# Patient Record
Sex: MALE | Race: WHITE | Employment: FULL TIME | URBAN - METROPOLITAN AREA
[De-identification: names, ages, dates, MRNs, and addresses within clinical notes are randomized per-mention and may not be internally consistent; named-entity substitution may affect disease eponyms.]

---

## 2022-08-22 ENCOUNTER — HOSPITAL ENCOUNTER (INPATIENT)
Age: 57
LOS: 1 days | Discharge: HOME | DRG: 084 | End: 2022-08-24
Attending: EMERGENCY MEDICINE | Admitting: SURGERY
Payer: COMMERCIAL

## 2022-08-22 ENCOUNTER — APPOINTMENT (OUTPATIENT)
Dept: CT IMAGING | Age: 57
DRG: 084 | End: 2022-08-22
Payer: COMMERCIAL

## 2022-08-22 ENCOUNTER — APPOINTMENT (OUTPATIENT)
Dept: GENERAL RADIOLOGY | Age: 57
DRG: 084 | End: 2022-08-22
Payer: COMMERCIAL

## 2022-08-22 DIAGNOSIS — I60.9 SUBARACHNOID HEMORRHAGE (HCC): ICD-10-CM

## 2022-08-22 DIAGNOSIS — R55 SYNCOPE AND COLLAPSE: Primary | ICD-10-CM

## 2022-08-22 LAB
ALBUMIN SERPL-MCNC: 4.7 G/DL (ref 3.5–5.2)
ALP BLD-CCNC: 96 U/L (ref 40–129)
ALT SERPL-CCNC: 25 U/L (ref 0–40)
AMPHETAMINE SCREEN, URINE: NOT DETECTED
ANION GAP SERPL CALCULATED.3IONS-SCNC: 12 MMOL/L (ref 7–16)
APTT: 28.5 SEC (ref 24.5–35.1)
AST SERPL-CCNC: 18 U/L (ref 0–39)
BARBITURATE SCREEN URINE: NOT DETECTED
BASOPHILS ABSOLUTE: 0.05 E9/L (ref 0–0.2)
BASOPHILS RELATIVE PERCENT: 0.6 % (ref 0–2)
BENZODIAZEPINE SCREEN, URINE: NOT DETECTED
BILIRUB SERPL-MCNC: 0.4 MG/DL (ref 0–1.2)
BUN BLDV-MCNC: 14 MG/DL (ref 6–20)
CALCIUM SERPL-MCNC: 9.5 MG/DL (ref 8.6–10.2)
CANNABINOID SCREEN URINE: NOT DETECTED
CHLORIDE BLD-SCNC: 103 MMOL/L (ref 98–107)
CO2: 24 MMOL/L (ref 22–29)
COCAINE METABOLITE SCREEN URINE: NOT DETECTED
CREAT SERPL-MCNC: 1.1 MG/DL (ref 0.7–1.2)
EOSINOPHILS ABSOLUTE: 0.08 E9/L (ref 0.05–0.5)
EOSINOPHILS RELATIVE PERCENT: 0.9 % (ref 0–6)
FENTANYL SCREEN, URINE: NOT DETECTED
GFR AFRICAN AMERICAN: >60
GFR NON-AFRICAN AMERICAN: >60 ML/MIN/1.73
GLUCOSE BLD-MCNC: 100 MG/DL (ref 74–99)
HCT VFR BLD CALC: 45.2 % (ref 37–54)
HEMOGLOBIN: 14.9 G/DL (ref 12.5–16.5)
IMMATURE GRANULOCYTES #: 0.03 E9/L
IMMATURE GRANULOCYTES %: 0.3 % (ref 0–5)
INR BLD: 1.1
LYMPHOCYTES ABSOLUTE: 1.69 E9/L (ref 1.5–4)
LYMPHOCYTES RELATIVE PERCENT: 19 % (ref 20–42)
Lab: NORMAL
MCH RBC QN AUTO: 28.6 PG (ref 26–35)
MCHC RBC AUTO-ENTMCNC: 33 % (ref 32–34.5)
MCV RBC AUTO: 86.8 FL (ref 80–99.9)
METHADONE SCREEN, URINE: NOT DETECTED
MONOCYTES ABSOLUTE: 0.5 E9/L (ref 0.1–0.95)
MONOCYTES RELATIVE PERCENT: 5.6 % (ref 2–12)
NEUTROPHILS ABSOLUTE: 6.56 E9/L (ref 1.8–7.3)
NEUTROPHILS RELATIVE PERCENT: 73.6 % (ref 43–80)
OPIATE SCREEN URINE: NOT DETECTED
OXYCODONE URINE: NOT DETECTED
PDW BLD-RTO: 12.6 FL (ref 11.5–15)
PHENCYCLIDINE SCREEN URINE: NOT DETECTED
PLATELET # BLD: 225 E9/L (ref 130–450)
PMV BLD AUTO: 10.4 FL (ref 7–12)
POTASSIUM REFLEX MAGNESIUM: 3.8 MMOL/L (ref 3.5–5)
PROTHROMBIN TIME: 12.1 SEC (ref 9.3–12.4)
RBC # BLD: 5.21 E12/L (ref 3.8–5.8)
SODIUM BLD-SCNC: 139 MMOL/L (ref 132–146)
TOTAL PROTEIN: 7.2 G/DL (ref 6.4–8.3)
TROPONIN, HIGH SENSITIVITY: 6 NG/L (ref 0–11)
WBC # BLD: 8.9 E9/L (ref 4.5–11.5)

## 2022-08-22 PROCEDURE — 70450 CT HEAD/BRAIN W/O DYE: CPT

## 2022-08-22 PROCEDURE — 80053 COMPREHEN METABOLIC PANEL: CPT

## 2022-08-22 PROCEDURE — 93005 ELECTROCARDIOGRAM TRACING: CPT | Performed by: PHYSICIAN ASSISTANT

## 2022-08-22 PROCEDURE — 6360000002 HC RX W HCPCS

## 2022-08-22 PROCEDURE — 96376 TX/PRO/DX INJ SAME DRUG ADON: CPT

## 2022-08-22 PROCEDURE — 85610 PROTHROMBIN TIME: CPT

## 2022-08-22 PROCEDURE — 85730 THROMBOPLASTIN TIME PARTIAL: CPT

## 2022-08-22 PROCEDURE — 71046 X-RAY EXAM CHEST 2 VIEWS: CPT

## 2022-08-22 PROCEDURE — 80307 DRUG TEST PRSMV CHEM ANLYZR: CPT

## 2022-08-22 PROCEDURE — 71275 CT ANGIOGRAPHY CHEST: CPT

## 2022-08-22 PROCEDURE — 85025 COMPLETE CBC W/AUTO DIFF WBC: CPT

## 2022-08-22 PROCEDURE — 70498 CT ANGIOGRAPHY NECK: CPT

## 2022-08-22 PROCEDURE — 96374 THER/PROPH/DIAG INJ IV PUSH: CPT

## 2022-08-22 PROCEDURE — 72125 CT NECK SPINE W/O DYE: CPT

## 2022-08-22 PROCEDURE — 6360000004 HC RX CONTRAST MEDICATION: Performed by: RADIOLOGY

## 2022-08-22 PROCEDURE — 70496 CT ANGIOGRAPHY HEAD: CPT

## 2022-08-22 PROCEDURE — 99285 EMERGENCY DEPT VISIT HI MDM: CPT

## 2022-08-22 PROCEDURE — 96372 THER/PROPH/DIAG INJ SC/IM: CPT

## 2022-08-22 PROCEDURE — 84484 ASSAY OF TROPONIN QUANT: CPT

## 2022-08-22 PROCEDURE — 2500000003 HC RX 250 WO HCPCS

## 2022-08-22 PROCEDURE — 96375 TX/PRO/DX INJ NEW DRUG ADDON: CPT

## 2022-08-22 RX ORDER — LABETALOL HYDROCHLORIDE 5 MG/ML
10 INJECTION, SOLUTION INTRAVENOUS ONCE
Status: COMPLETED | OUTPATIENT
Start: 2022-08-22 | End: 2022-08-22

## 2022-08-22 RX ORDER — HYDRALAZINE HYDROCHLORIDE 20 MG/ML
10 INJECTION INTRAMUSCULAR; INTRAVENOUS ONCE
Status: DISCONTINUED | OUTPATIENT
Start: 2022-08-22 | End: 2022-08-22

## 2022-08-22 RX ORDER — HYDRALAZINE HYDROCHLORIDE 20 MG/ML
10 INJECTION INTRAMUSCULAR; INTRAVENOUS
Status: DISCONTINUED | OUTPATIENT
Start: 2022-08-22 | End: 2022-08-23 | Stop reason: ALTCHOICE

## 2022-08-22 RX ADMIN — IOPAMIDOL 120 ML: 755 INJECTION, SOLUTION INTRAVENOUS at 22:49

## 2022-08-22 RX ADMIN — TRIMETHOBENZAMIDE HYDROCHLORIDE 200 MG: 100 INJECTION INTRAMUSCULAR at 22:53

## 2022-08-22 RX ADMIN — LABETALOL HYDROCHLORIDE 10 MG: 5 INJECTION, SOLUTION INTRAVENOUS at 21:59

## 2022-08-22 RX ADMIN — LABETALOL HYDROCHLORIDE 10 MG: 5 INJECTION INTRAVENOUS at 21:16

## 2022-08-22 RX ADMIN — HYDRALAZINE HYDROCHLORIDE 10 MG: 20 INJECTION INTRAMUSCULAR; INTRAVENOUS at 22:53

## 2022-08-22 RX ADMIN — HYDRALAZINE HYDROCHLORIDE 10 MG: 20 INJECTION INTRAMUSCULAR; INTRAVENOUS at 23:20

## 2022-08-22 ASSESSMENT — PAIN DESCRIPTION - LOCATION: LOCATION: HEAD;BACK

## 2022-08-22 ASSESSMENT — PAIN SCALES - GENERAL
PAINLEVEL_OUTOF10: 4
PAINLEVEL_OUTOF10: 6

## 2022-08-22 ASSESSMENT — PAIN - FUNCTIONAL ASSESSMENT: PAIN_FUNCTIONAL_ASSESSMENT: 0-10

## 2022-08-22 NOTE — ED NOTES
Department of Emergency Medicine    FIRST PROVIDER TRIAGE NOTE             Independent MLP           8/22/22  4:48 PM EDT    Date of Encounter: 8/22/22   MRN: [id number]    Vitals:    08/22/22 1634 08/22/22 1647   BP:  (!) 188/112   Pulse: 99    Resp:  18   Temp: 97.4 °F (36.3 °C)    TempSrc: Oral    SpO2: 98%       HPI: Vergia Olivia is a 60 y.o. male who presents to the ED for Loss of Consciousness (Pt was at work and had a sudden loss of consciousness. Pt reports today first day back to work after having covid. )   Reports headache     ROS: Negative for cp or sob. Physical Exam:   Gen Appearance/Constitutional: alert  CV: regular rate     Initial Plan of Care: All treatment areas with department are currently occupied. Plan to order/Initiate the following while awaiting opening in ED: labs, EKG, and imaging studies.     Initial Plan of Care: Initiate Treatment-Testing, Proceed toTreatment Area When Bed Available for ED Attending/MLP to Continue Care    Electronically signed by Mana Chavez PA-C   DD: 8/22/22       Mana Chavez PA-C  08/22/22 0446

## 2022-08-23 ENCOUNTER — APPOINTMENT (OUTPATIENT)
Dept: GENERAL RADIOLOGY | Age: 57
DRG: 084 | End: 2022-08-23
Payer: COMMERCIAL

## 2022-08-23 PROBLEM — R55 SYNCOPE: Status: ACTIVE | Noted: 2022-08-23

## 2022-08-23 PROBLEM — T14.90XA TRAUMA: Status: ACTIVE | Noted: 2022-08-23

## 2022-08-23 PROBLEM — I60.9 SAH (SUBARACHNOID HEMORRHAGE) (HCC): Status: ACTIVE | Noted: 2022-08-23

## 2022-08-23 LAB
ACETAMINOPHEN LEVEL: <5 MCG/ML (ref 10–30)
ANGLE (CLOT STRENGTH): 76.6 DEGREE (ref 59–74)
EKG ATRIAL RATE: 94 BPM
EKG P AXIS: 33 DEGREES
EKG P-R INTERVAL: 190 MS
EKG Q-T INTERVAL: 404 MS
EKG QRS DURATION: 140 MS
EKG QTC CALCULATION (BAZETT): 505 MS
EKG R AXIS: 35 DEGREES
EKG T AXIS: -83 DEGREES
EKG VENTRICULAR RATE: 94 BPM
EPL-TEG: 1.7 % (ref 0–15)
ETHANOL: <10 MG/DL (ref 0–0.08)
G-TEG: 12.3 K D/SC (ref 4.5–11)
K (CLOTTING TIME): 0.9 MIN (ref 1–3)
LY30 (FIBRINOLYSIS): 1.7 % (ref 0–8)
MA (MAX AMPLITUDE): 71.1 MM (ref 50–70)
R (REACTION TIME): 2.9 MIN (ref 5–10)
REASON FOR REJECTION: NORMAL
REJECTED TEST: NORMAL
SALICYLATE, SERUM: <0.3 MG/DL (ref 0–30)
TRICYCLIC ANTIDEPRESSANTS SCREEN SERUM: NEGATIVE NG/ML
TROPONIN, HIGH SENSITIVITY: 7 NG/L (ref 0–11)
TSH SERPL DL<=0.05 MIU/L-ACNC: 1.36 UIU/ML (ref 0.27–4.2)

## 2022-08-23 PROCEDURE — 2060000000 HC ICU INTERMEDIATE R&B

## 2022-08-23 PROCEDURE — 82077 ASSAY SPEC XCP UR&BREATH IA: CPT

## 2022-08-23 PROCEDURE — 2580000003 HC RX 258: Performed by: STUDENT IN AN ORGANIZED HEALTH CARE EDUCATION/TRAINING PROGRAM

## 2022-08-23 PROCEDURE — 96376 TX/PRO/DX INJ SAME DRUG ADON: CPT

## 2022-08-23 PROCEDURE — 97161 PT EVAL LOW COMPLEX 20 MIN: CPT

## 2022-08-23 PROCEDURE — 85576 BLOOD PLATELET AGGREGATION: CPT

## 2022-08-23 PROCEDURE — 72170 X-RAY EXAM OF PELVIS: CPT

## 2022-08-23 PROCEDURE — 84484 ASSAY OF TROPONIN QUANT: CPT

## 2022-08-23 PROCEDURE — 6370000000 HC RX 637 (ALT 250 FOR IP): Performed by: STUDENT IN AN ORGANIZED HEALTH CARE EDUCATION/TRAINING PROGRAM

## 2022-08-23 PROCEDURE — 99223 1ST HOSP IP/OBS HIGH 75: CPT | Performed by: SURGERY

## 2022-08-23 PROCEDURE — 80143 DRUG ASSAY ACETAMINOPHEN: CPT

## 2022-08-23 PROCEDURE — 84443 ASSAY THYROID STIM HORMONE: CPT

## 2022-08-23 PROCEDURE — 6360000002 HC RX W HCPCS

## 2022-08-23 PROCEDURE — 80307 DRUG TEST PRSMV CHEM ANLYZR: CPT

## 2022-08-23 PROCEDURE — 85384 FIBRINOGEN ACTIVITY: CPT

## 2022-08-23 PROCEDURE — 6370000000 HC RX 637 (ALT 250 FOR IP)

## 2022-08-23 PROCEDURE — 36415 COLL VENOUS BLD VENIPUNCTURE: CPT

## 2022-08-23 PROCEDURE — 85347 COAGULATION TIME ACTIVATED: CPT

## 2022-08-23 PROCEDURE — 80179 DRUG ASSAY SALICYLATE: CPT

## 2022-08-23 RX ORDER — SODIUM CHLORIDE 0.9 % (FLUSH) 0.9 %
10 SYRINGE (ML) INJECTION EVERY 12 HOURS SCHEDULED
Status: DISCONTINUED | OUTPATIENT
Start: 2022-08-23 | End: 2022-08-24 | Stop reason: HOSPADM

## 2022-08-23 RX ORDER — ONDANSETRON 4 MG/1
4 TABLET, ORALLY DISINTEGRATING ORAL EVERY 8 HOURS PRN
Status: DISCONTINUED | OUTPATIENT
Start: 2022-08-23 | End: 2022-08-24 | Stop reason: HOSPADM

## 2022-08-23 RX ORDER — LEVETIRACETAM 500 MG/1
500 TABLET ORAL 2 TIMES DAILY
Status: DISCONTINUED | OUTPATIENT
Start: 2022-08-23 | End: 2022-08-24 | Stop reason: HOSPADM

## 2022-08-23 RX ORDER — SODIUM CHLORIDE 9 MG/ML
INJECTION, SOLUTION INTRAVENOUS PRN
Status: DISCONTINUED | OUTPATIENT
Start: 2022-08-23 | End: 2022-08-24 | Stop reason: HOSPADM

## 2022-08-23 RX ORDER — LABETALOL HYDROCHLORIDE 5 MG/ML
10 INJECTION, SOLUTION INTRAVENOUS
Status: DISCONTINUED | OUTPATIENT
Start: 2022-08-23 | End: 2022-08-24 | Stop reason: HOSPADM

## 2022-08-23 RX ORDER — ACETAMINOPHEN 500 MG
1000 TABLET ORAL ONCE
Status: COMPLETED | OUTPATIENT
Start: 2022-08-23 | End: 2022-08-23

## 2022-08-23 RX ORDER — ONDANSETRON 2 MG/ML
4 INJECTION INTRAMUSCULAR; INTRAVENOUS EVERY 6 HOURS PRN
Status: DISCONTINUED | OUTPATIENT
Start: 2022-08-23 | End: 2022-08-24 | Stop reason: HOSPADM

## 2022-08-23 RX ORDER — POLYETHYLENE GLYCOL 3350 17 G/17G
17 POWDER, FOR SOLUTION ORAL DAILY
Status: DISCONTINUED | OUTPATIENT
Start: 2022-08-23 | End: 2022-08-24 | Stop reason: HOSPADM

## 2022-08-23 RX ORDER — HYDRALAZINE HYDROCHLORIDE 20 MG/ML
10 INJECTION INTRAMUSCULAR; INTRAVENOUS
Status: DISCONTINUED | OUTPATIENT
Start: 2022-08-23 | End: 2022-08-24 | Stop reason: HOSPADM

## 2022-08-23 RX ORDER — ACETAMINOPHEN 325 MG/1
650 TABLET ORAL EVERY 4 HOURS PRN
Status: DISCONTINUED | OUTPATIENT
Start: 2022-08-23 | End: 2022-08-24 | Stop reason: HOSPADM

## 2022-08-23 RX ORDER — SODIUM CHLORIDE 0.9 % (FLUSH) 0.9 %
10 SYRINGE (ML) INJECTION PRN
Status: DISCONTINUED | OUTPATIENT
Start: 2022-08-23 | End: 2022-08-24 | Stop reason: HOSPADM

## 2022-08-23 RX ADMIN — ACETAMINOPHEN 1000 MG: 500 TABLET ORAL at 00:43

## 2022-08-23 RX ADMIN — LEVETIRACETAM 500 MG: 500 TABLET, FILM COATED ORAL at 09:14

## 2022-08-23 RX ADMIN — Medication 10 ML: at 11:20

## 2022-08-23 RX ADMIN — HYDRALAZINE HYDROCHLORIDE 10 MG: 20 INJECTION INTRAMUSCULAR; INTRAVENOUS at 00:07

## 2022-08-23 RX ADMIN — LEVETIRACETAM 500 MG: 500 TABLET, FILM COATED ORAL at 20:29

## 2022-08-23 RX ADMIN — Medication 10 ML: at 20:30

## 2022-08-23 RX ADMIN — LEVETIRACETAM 500 MG: 500 TABLET, FILM COATED ORAL at 01:27

## 2022-08-23 ASSESSMENT — PAIN SCALES - GENERAL: PAINLEVEL_OUTOF10: 0

## 2022-08-23 ASSESSMENT — ENCOUNTER SYMPTOMS
ABDOMINAL DISTENTION: 0
SHORTNESS OF BREATH: 1
DIARRHEA: 0
VOMITING: 0
CONSTIPATION: 0
WHEEZING: 0
EYE REDNESS: 0
RHINORRHEA: 0
CHEST TIGHTNESS: 0
BACK PAIN: 0
ABDOMINAL PAIN: 0
TROUBLE SWALLOWING: 0
EYE ITCHING: 0
NAUSEA: 0

## 2022-08-23 NOTE — ED NOTES
Pt requesting to go home. Admitting team perfect served.  Pt given lunch juan Mello RN  08/23/22 [id number]

## 2022-08-23 NOTE — PROGRESS NOTES
Physical Therapy    Initial Assessment     Name: Ricardo Weiss  : 1965  MRN: [id number]      Date of Service: 2022    Evaluating PT: Gin Padilla PT, DPT [id number]      Room #:  8753/7383-P  Diagnosis:  Trauma [T14.90XA]  PMHx/PSHx:  HTN, LBBB, JOANNA on CPAP  Precautions:  SAH    SUBJECTIVE:    Pt lives with wife in a 2 story house with level entry. 7 stairs and 2 rails up to second floor bed and bath. Pt ambulated without AD prior to admission. 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 OBJECTIVE:   Initial Evaluation  Date:    AM-PAC 6 Clicks /   Was pt agreeable to Eval/treatment? Yes   Does pt have pain? /10 headache pain   Bed Mobility  Rolling: NT  Supine to sit: NT  Sit to supine: NT  Scooting: NT   Transfers Sit to stand: Independent   Stand to sit: Independent   Stand pivot: Independent    Ambulation   200 feet Independent    Stair negotiation: ascended and descended NT   ROM BUE: Refer to OT note  BLE: WFL   Strength BUE: Refer to OT note  BLE: WFL   Balance Sitting EOB: Independent   Dynamic Standing: Independent      Pt is A & O x: 4 to person, place, month/year, and situation. Sensation: Pt denies numbness and tingling of extremities. Edema: Unremarkable. Patient education  Pt educated on PT role in acute care setting. Patient response to education:   Pt verbalized understanding Pt demonstrated skill Pt requires further education in this area   Yes NA No     ASSESSMENT:    Comments:    Pt was seated at EOB upon room entry, agreeable to PT evaluation. Visitors present throughout session. Pt ambulated [facility] around unit without AD. Gait was slow but fairly steady. Pt reports they are at functional baseline and that there are no skilled PT needs at this time. Pt was left seated at EOB.     PHYSICAL THERAPY PLAN OF CARE:    PT POC is established based on physician order and patient diagnosis     Based on pt's current level of independence for all functional mobility, this pt is not a candidate for continued skilled PT services. Pt is agreeable that there are no skilled PT needs at this time. Will complete order and remove pt from PT caseload. Please re-consult if pt experiences functional decline. Thank you. Referring provider/PT Order:    Start   Ordering Provider    08/23/22 0915  PT evaluation and treat  Start:  08/23/22 0915,   End:  08/23/22 0915,   ONE TIME,   Standing Count:  1 Occurrences,   Teresa Chow MD      Diagnosis:  Trauma [T14.90XA]    Time in  1500  Time out  1510    Total Treatment Time  0 minutes     Evaluation Time includes thorough review of current medical information, gathering information on past medical history/social history and prior level of function, completion of standardized testing/informal observation of tasks, assessment of data and education on plan of care and goals.     CPT codes:  [x] Low Complexity PT evaluation 85483  [] Moderate Complexity PT evaluation 80704  [] High Complexity PT evaluation 60279  [] PT Re-evaluation 98363  [] Gait training 26342 0 minutes  [] Manual therapy 48303 0 minutes  [] Therapeutic activities 11262 0 minutes  [] Therapeutic exercises 35841 0 minutes  [] Neuromuscular reeducation 37349 0 minutes     Stefania Wild, PT, DPT  [id number]

## 2022-08-23 NOTE — CONSULTS
Neurosurgery Consult Note      CHIEF COMPLAINT: syncopal episode sustaining right frontal traumatic SAH     HPI:  Trauma consult. Injury occurred 3 PM yesterday afternoon. Patient was reportedly at work. He had a syncopal fall. He is amnestic to the event. He thinks that he hit his head. He reports loss of consciousness. He has had multiple prior similar episodes in the past.  He states that he thinks that he saw a doctor for these in 2011 and he was told that he was dehydrated. He is GCS 15 and neurologically intact. He is not on anticoagulation. Patient reportedly has a known left bundle branch block and occasional PVCs. He does not see anyone for these in a long time. He underwent CT head which revealed frontal SAH. Past Medical History:   Diagnosis Date    Hypertension     LBBB (left bundle branch block)     Obesity     JOANNA on CPAP 2001    SOB (shortness of breath) 2001     Past Surgical History:   Procedure Laterality Date    [address]    abdominal hernia     Paxil [paroxetine] and Pcn [penicillins]  Prior to Admission medications    Not on File     No outpatient medications have been marked as taking for the 8/22/22 encounter [facility] Encounter).      Social History     Socioeconomic History    Marital status:      Spouse name: Not on file    Number of children: Not on file    Years of education: Not on file    Highest education level: Not on file   Occupational History    Not on file   Tobacco Use    Smoking status: Never    Smokeless tobacco: Current   Substance and Sexual Activity    Alcohol use: No    Drug use: Not on file    Sexual activity: Not on file   Other Topics Concern    Not on file   Social History Narrative    Not on file     Social Determinants of Health     Financial Resource Strain: Not on file   Food Insecurity: Not on file   Transportation Needs: Not on file   Physical Activity: Not on file   Stress: Not on file   Social Connections: Not on file   Intimate Partner Violence: Not on file   Housing Stability: Not on file     Family History   Adopted: Yes       ROS: Complete 10 point ROS was obtained with positives in HPI, otherwise:  Pt denies fevers, denies chills. Pt denies chest pain, denies SOB, denies nausea, denies vomiting, denies headache. VITALS/DRAINS:   VITALS:  BP (!) 148/83   Pulse 93   Temp 97.4 °F (36.3 °C) (Oral)   Resp 16   Ht 5' 10\" (1.778 m)   Wt 219 lb (99.3 kg)   SpO2 97%   BMI 31.42 kg/m²   24HR INTAKE/OUTPUT:  No intake or output data in the 24 hours ending 08/23/22 1657    EXAMINATION:Laying supine in hospital bed awake alert oriented friendly and cooperative no acute distress speech is fluent answer questions appropriately oriented x3  Cranial Nerves:Pupils equal round reactive light extraocular is full visual fields are full facial expressions symmetric shoulder shrug normal tongue midline  Motor:No weakness 5 out of 5 throughout  Sensory:No deficits intact to light touch throughout      IMAGING STUDIES:  XR CHEST (2 VW)    Result Date: 8/22/2022  EXAMINATION: TWO XRAY VIEWS OF THE CHEST 8/22/2022 5:29 pm COMPARISON: Chest, two view 09/05/2017 HISTORY: ORDERING SYSTEM PROVIDED HISTORY: syncope TECHNOLOGIST PROVIDED HISTORY: Reason for exam:->syncope What reading provider will be dictating this exam?->CRC FINDINGS: PA and lateral views of the chest were obtained. Heart, mediastinum, and pulmonary vasculature are within normal limits. There is biapical pleuroparenchymal scarring. Lungs and pleural spaces are otherwise clear. No active cardiopulmonary disease. XR PELVIS (1-2 VIEWS)    Result Date: 8/23/2022  EXAMINATION: ONE XRAY VIEW OF THE PELVIS 8/23/2022 1:05 am COMPARISON: None. HISTORY: ORDERING SYSTEM PROVIDED HISTORY: trauma TECHNOLOGIST PROVIDED HISTORY: Reason for exam:->trauma What reading provider will be dictating this exam?->CRC FINDINGS: There is no evidence of acute fracture. There is normal alignment. No acute joint abnormality. No focal osseous lesion. No focal soft tissue abnormality. No acute osseous abnormality. CT HEAD WO CONTRAST    Result Date: 8/22/2022  EXAMINATION: CT OF THE HEAD WITHOUT CONTRAST; CT OF THE CERVICAL SPINE WITHOUT CONTRAST 8/22/2022 5:43 pm TECHNIQUE: CT of the head was performed without the administration of intravenous contrast. Automated exposure control, iterative reconstruction, and/or weight based adjustment of the mA/kV was utilized to reduce the radiation dose to as low as reasonably achievable.; CT of the cervical spine was performed without the administration of intravenous contrast. Multiplanar reformatted images are provided for review. Automated exposure control, iterative reconstruction, and/or weight based adjustment of the mA/kV was utilized to reduce the radiation dose to as low as reasonably achievable. COMPARISON: None. HISTORY: ORDERING SYSTEM PROVIDED HISTORY: LOC- Activity Rocket head TECHNOLOGIST PROVIDED HISTORY: Reason for exam:->LOC- hit head Has a \"code stroke\" or \"stroke alert\" been called? ->No Decision Support Exception - unselect if not a suspected or confirmed emergency medical condition->Emergency Medical Condition ([place]) What reading provider will be dictating this exam?->CRC FINDINGS: CT OF THE BRAIN: BRAIN/VENTRICLES: A small focus of predominantly if not entirely subarachnoid hemorrhage is seen in the anterior right frontal lobe (series 2, image 34). There is likely involvement of the adjacent cortex as well. No significant edema or mass effect is seen. The brain is normal in volume. No hydrocephalus seen. ORBITS: The visualized portion of the orbits demonstrate no acute abnormality. SINUSES: Mild-to-moderate mucosal thickening is seen in the right sphenoid sinus. Mild mucosal thickening in the ethmoid sinuses. SOFT TISSUES/SKULL: The calvarium is intact without fracture or focal lesion.  The left posterior scalp hematoma is noted. CT CERVICAL SPINE: BONES/ALIGNMENT: There is no acute fracture or traumatic malalignment. DEGENERATIVE CHANGES: Moderate loss of disc height with a disc osteophyte complex is noted at C6-7. At C6-7, there is mild central canal stenosis and moderate neural foraminal stenoses. SOFT TISSUES: There is no prevertebral soft tissue swelling. CT HEAD WITHOUT CONTRAST: 1. SMALL VOLUME SUBARACHNOID BLOOD is seen along the right anterior convexity, with probable involvement of the adjacent cortex. Findings appear to represent a contrecoup injury, given the presence of a hematoma in the left posterior scalp. 2. No mass effect or midline shift. 3. No skull fracture. CT CERVICAL SPINE WITHOUT CONTRAST: 1. No fracture or joint dislocation is seen. 2. Degenerative changes, as described. CT CERVICAL SPINE WO CONTRAST    Result Date: 8/22/2022  EXAMINATION: CT OF THE HEAD WITHOUT CONTRAST; CT OF THE CERVICAL SPINE WITHOUT CONTRAST 8/22/2022 5:43 pm TECHNIQUE: CT of the head was performed without the administration of intravenous contrast. Automated exposure control, iterative reconstruction, and/or weight based adjustment of the mA/kV was utilized to reduce the radiation dose to as low as reasonably achievable.; CT of the cervical spine was performed without the administration of intravenous contrast. Multiplanar reformatted images are provided for review. Automated exposure control, iterative reconstruction, and/or weight based adjustment of the mA/kV was utilized to reduce the radiation dose to as low as reasonably achievable. COMPARISON: None. HISTORY: ORDERING SYSTEM PROVIDED HISTORY: LOC- hit head TECHNOLOGIST PROVIDED HISTORY: Reason for exam:->LOC- hit head Has a \"code stroke\" or \"stroke alert\" been called? ->No Decision Support Exception - unselect if not a suspected or confirmed emergency medical condition->Emergency Medical Condition ([place]) What reading provider will be dictating this exam?->CRC FINDINGS: CT OF THE BRAIN: BRAIN/VENTRICLES: A small focus of predominantly if not entirely subarachnoid hemorrhage is seen in the anterior right frontal lobe (series 2, image 34). There is likely involvement of the adjacent cortex as well. No significant edema or mass effect is seen. The brain is normal in volume. No hydrocephalus seen. ORBITS: The visualized portion of the orbits demonstrate no acute abnormality. SINUSES: Mild-to-moderate mucosal thickening is seen in the right sphenoid sinus. Mild mucosal thickening in the ethmoid sinuses. SOFT TISSUES/SKULL: The calvarium is intact without fracture or focal lesion. The left posterior scalp hematoma is noted. CT CERVICAL SPINE: BONES/ALIGNMENT: There is no acute fracture or traumatic malalignment. DEGENERATIVE CHANGES: Moderate loss of disc height with a disc osteophyte complex is noted at C6-7. At C6-7, there is mild central canal stenosis and moderate neural foraminal stenoses. SOFT TISSUES: There is no prevertebral soft tissue swelling. CT HEAD WITHOUT CONTRAST: 1. SMALL VOLUME SUBARACHNOID BLOOD is seen along the right anterior convexity, with probable involvement of the adjacent cortex. Findings appear to represent a contrecoup injury, given the presence of a hematoma in the left posterior scalp. 2. No mass effect or midline shift. 3. No skull fracture. CT CERVICAL SPINE WITHOUT CONTRAST: 1. No fracture or joint dislocation is seen. 2. Degenerative changes, as described. CTA NECK W CONTRAST    Result Date: 8/22/2022  EXAMINATION: CTA OF THE HEAD WITH CONTRAST; CTA OF THE NECK 8/22/2022 10:49 pm: TECHNIQUE: CTA of the head/brain was performed with the administration of intravenous contrast. Multiplanar reformatted images are provided for review. MIP images are provided for review. Automated exposure control, iterative reconstruction, and/or weight based adjustment of the mA/kV was utilized to reduce the radiation dose to as low as reasonably achievable. ; CTA of the neck was performed with the administration of intravenous contrast. Multiplanar reformatted images are provided for review. MIP images are provided for review. Stenosis of the internal carotid arteries measured using NASCET criteria. Automated exposure control, iterative reconstruction, and/or weight based adjustment of the mA/kV was utilized to reduce the radiation dose to as low as reasonably achievable. Noncontrast CT of the head with reconstructed 2-D images are also provided for review. COMPARISON: Head CT from earlier the same day. HISTORY: ORDERING SYSTEM PROVIDED HISTORY: fall, subarachnoid hemorrhage on non-con head TECHNOLOGIST PROVIDED HISTORY: Reason for exam:->fall, subarachnoid hemorrhage on non-con head Has a \"code stroke\" or \"stroke alert\" been called? ->No Decision Support Exception - unselect if not a suspected or confirmed emergency medical condition->Emergency Medical Condition ([place]) What reading provider will be dictating this exam?->CRC; ORDERING SYSTEM PROVIDED HISTORY: fall, subarachnoid hemorrhage TECHNOLOGIST PROVIDED HISTORY: Reason for exam:->fall, subarachnoid hemorrhage Has a \"code stroke\" or \"stroke alert\" been called? ->No Decision Support Exception - unselect if not a suspected or confirmed emergency medical condition->Emergency Medical Condition ([place]) What reading provider will be dictating this exam?->CRC FINDINGS: CT HEAD: BRAIN/VENTRICLES:  Stable mild subarachnoid hemorrhage along the right frontal sulcations. Differentiation is maintained. No evidence of mass, mass effect or midline shift. No evidence of hydrocephalus. ORBITS: The visualized portion of the orbits demonstrate no acute abnormality. SINUSES:  Mild-to-moderate circumferential mucosal thickening involving the right maxillary sinus. The remainder of the visualized paranasal sinuses and mastoid air cells demonstrate no acute abnormality.  SOFT TISSUES/SKULL: No acute abnormality of the visualized skull or soft tissues. CTA NECK: AORTIC ARCH/ARCH VESSELS: No dissection or arterial injury. No significant stenosis of the brachiocephalic or subclavian arteries. CAROTID ARTERIES: No dissection, arterial injury, or hemodynamically significant stenosis by NASCET criteria. VERTEBRAL ARTERIES: No dissection, arterial injury, or significant stenosis. SOFT TISSUES: The lung apices are clear. No cervical or superior mediastinal lymphadenopathy. The larynx and pharynx are unremarkable. No acute abnormality of the salivary and thyroid glands. BONES: No acute osseous abnormality. CTA HEAD: ANTERIOR CIRCULATION: No significant stenosis of the intracranial internal carotid, anterior cerebral, or middle cerebral arteries. No aneurysm. POSTERIOR CIRCULATION: No significant stenosis of the vertebral, basilar, or posterior cerebral arteries. No aneurysm. OTHER: No dural venous sinus thrombosis on this non-dedicated study. 1. Stable mild subarachnoid hemorrhage along the right frontal sulcations. 2. No evidence of large vessel occlusion or hemodynamically significant stenosis involving the head and neck arteries. No evidence of aneurysm or vascular malformation. CTA PULMONARY W CONTRAST    Result Date: 8/22/2022  EXAMINATION: CTA OF THE CHEST 8/22/2022 10:49 pm TECHNIQUE: CTA of the chest was performed after the administration of intravenous contrast.  Multiplanar reformatted images are provided for review. MIP images are provided for review. Automated exposure control, iterative reconstruction, and/or weight based adjustment of the mA/kV was utilized to reduce the radiation dose to as low as reasonably achievable. COMPARISON: None.  HISTORY: ORDERING SYSTEM PROVIDED HISTORY: pe TECHNOLOGIST PROVIDED HISTORY: Reason for exam:->pe Decision Support Exception - unselect if not a suspected or confirmed emergency medical condition->Emergency Medical Condition ([place]) What reading provider will be dictating this exam?->CRC FINDINGS: Pulmonary Arteries: Pulmonary arteries are adequately opacified for evaluation. No evidence of intraluminal filling defect to suggest pulmonary embolism. Main pulmonary artery is normal in caliber. Mediastinum: No evidence of mediastinal lymphadenopathy. The heart and pericardium demonstrate no acute abnormality. There is no acute abnormality of the thoracic aorta. Lungs/pleura: The lungs are without acute process. No focal consolidation or pulmonary edema. No evidence of pleural effusion or pneumothorax. Upper Abdomen: Limited images of the upper abdomen are unremarkable. Soft Tissues/Bones: No acute bone or soft tissue abnormality. No evidence of pulmonary embolism or acute pulmonary abnormality. RECOMMENDATIONS: Unavailable     CTA HEAD W CONTRAST    Result Date: 8/22/2022  EXAMINATION: CTA OF THE HEAD WITH CONTRAST; CTA OF THE NECK 8/22/2022 10:49 pm: TECHNIQUE: CTA of the head/brain was performed with the administration of intravenous contrast. Multiplanar reformatted images are provided for review. MIP images are provided for review. Automated exposure control, iterative reconstruction, and/or weight based adjustment of the mA/kV was utilized to reduce the radiation dose to as low as reasonably achievable.; CTA of the neck was performed with the administration of intravenous contrast. Multiplanar reformatted images are provided for review. MIP images are provided for review. Stenosis of the internal carotid arteries measured using NASCET criteria. Automated exposure control, iterative reconstruction, and/or weight based adjustment of the mA/kV was utilized to reduce the radiation dose to as low as reasonably achievable. Noncontrast CT of the head with reconstructed 2-D images are also provided for review. COMPARISON: Head CT from earlier the same day.  HISTORY: ORDERING SYSTEM PROVIDED HISTORY: fall, subarachnoid hemorrhage on non-con head TECHNOLOGIST PROVIDED HISTORY: Reason for exam:->fall, subarachnoid hemorrhage on non-con head Has a \"code stroke\" or \"stroke alert\" been called? ->No Decision Support Exception - unselect if not a suspected or confirmed emergency medical condition->Emergency Medical Condition ([place]) What reading provider will be dictating this exam?->CRC; ORDERING SYSTEM PROVIDED HISTORY: fall, subarachnoid hemorrhage TECHNOLOGIST PROVIDED HISTORY: Reason for exam:->fall, subarachnoid hemorrhage Has a \"code stroke\" or \"stroke alert\" been called? ->No Decision Support Exception - unselect if not a suspected or confirmed emergency medical condition->Emergency Medical Condition (MA) What reading provider will be dictating this exam?->CRC FINDINGS: CT HEAD: BRAIN/VENTRICLES:  Stable mild subarachnoid hemorrhage along the right frontal sulcations. Differentiation is maintained. No evidence of mass, mass effect or midline shift. No evidence of hydrocephalus. ORBITS: The visualized portion of the orbits demonstrate no acute abnormality. SINUSES:  Mild-to-moderate circumferential mucosal thickening involving the right maxillary sinus. The remainder of the visualized paranasal sinuses and mastoid air cells demonstrate no acute abnormality. SOFT TISSUES/SKULL: No acute abnormality of the visualized skull or soft tissues. CTA NECK: AORTIC ARCH/ARCH VESSELS: No dissection or arterial injury. No significant stenosis of the brachiocephalic or subclavian arteries. CAROTID ARTERIES: No dissection, arterial injury, or hemodynamically significant stenosis by NASCET criteria. VERTEBRAL ARTERIES: No dissection, arterial injury, or significant stenosis. SOFT TISSUES: The lung apices are clear. No cervical or superior mediastinal lymphadenopathy. The larynx and pharynx are unremarkable. No acute abnormality of the salivary and thyroid glands. BONES: No acute osseous abnormality.  CTA HEAD: ANTERIOR CIRCULATION: No significant stenosis of the intracranial internal carotid, anterior cerebral, or middle cerebral arteries. No aneurysm. POSTERIOR CIRCULATION: No significant stenosis of the vertebral, basilar, or posterior cerebral arteries. No aneurysm. OTHER: No dural venous sinus thrombosis on this non-dedicated study. 1. Stable mild subarachnoid hemorrhage along the right frontal sulcations. 2. No evidence of large vessel occlusion or hemodynamically significant stenosis involving the head and neck arteries. No evidence of aneurysm or vascular malformation. LABS:  CBC:   Lab Results   Component Value Date/Time    WBC 8.9 08/22/2022 03:45 PM    RBC 5.21 08/22/2022 03:45 PM    HGB 14.9 08/22/2022 03:45 PM    HCT 45.2 08/22/2022 03:45 PM    MCV 86.8 08/22/2022 03:45 PM    MCH 28.6 08/22/2022 03:45 PM    MCHC 33.0 08/22/2022 03:45 PM    RDW 12.6 08/22/2022 03:45 PM     08/22/2022 03:45 PM    MPV 10.4 08/22/2022 03:45 PM     BMP:    Lab Results   Component Value Date/Time     08/22/2022 03:45 PM    K 3.8 08/22/2022 03:45 PM     08/22/2022 03:45 PM    CO2 24 08/22/2022 03:45 PM    BUN 14 08/22/2022 03:45 PM    LABALBU 4.7 08/22/2022 03:45 PM    CREATININE 1.1 08/22/2022 03:45 PM    CALCIUM 9.5 08/22/2022 03:45 PM    GFRAA >60 08/22/2022 03:45 PM    LABGLOM >60 08/22/2022 03:45 PM    GLUCOSE 100 08/22/2022 03:45 PM       IMPRESSION: Syncopal episode with minimal trace subarachnoid hemorrhage  RECOMMENDATIONS: Agree with Keppra will need a head CT approximately 2 weeks Keppra until then agree with cardiology consult for syncopal episode    Thank you again for this consultation.       Marta Joyner MD  8/23/2022

## 2022-08-23 NOTE — ED PROVIDER NOTES
Hilton Telles is a 60 y.o. male    Chief Complaint   Patient presents with    Loss of Consciousness     Pt was at work and had a sudden loss of consciousness. Pt reports today first day back to work after having covid. HPI   Hilton Telles is a 60 y.o. male presenting to the ED for Loss of Consciousness (Pt was at work and had a sudden loss of consciousness. Pt reports today first day back to work after having covid. )    History comes primarily from the patient. Patient presents to the emergency department for syncopal episode that happened around 3:30 PM.  Patient was simply walking at work and suddenly felt everything go black. He does not remember, but he apparently hit his head. When he woke up he did feel slightly confused but this resolved rather quickly. The patient's only other complaint is lingering shortness of breath since he recently had COVID and this was his first day back to work afterwards. Patient is not feeling lightheaded or dizzy at this time, no chest pain, no abdominal pain, no urinary or bowel symptoms. No complaints of vision changes, fever or chills. Review of Systems   Constitutional:  Negative for appetite change, fatigue and fever. HENT:  Negative for congestion, rhinorrhea and trouble swallowing. Eyes:  Negative for redness and itching. Respiratory:  Positive for shortness of breath. Negative for chest tightness and wheezing. Cardiovascular:  Negative for chest pain, palpitations and leg swelling. Gastrointestinal:  Negative for abdominal distention, abdominal pain, constipation, diarrhea, nausea and vomiting. Genitourinary:  Negative for decreased urine volume, difficulty urinating and frequency. Musculoskeletal:  Negative for arthralgias, back pain and myalgias. Neurological:  Positive for syncope. Negative for dizziness, weakness, numbness and headaches.    Psychiatric/Behavioral:  Negative for agitation, behavioral problems, confusion and decreased concentration. The patient is not nervous/anxious. All other systems reviewed and are negative. Physical Exam  Vitals reviewed. Constitutional:       General: He is not in acute distress. Appearance: Normal appearance. He is not ill-appearing. HENT:      Head: Normocephalic and atraumatic. Right Ear: External ear normal.      Left Ear: External ear normal.      Nose: Nose normal. No rhinorrhea. Mouth/Throat:      Mouth: Mucous membranes are moist.      Pharynx: Oropharynx is clear. Eyes:      Extraocular Movements: Extraocular movements intact. Conjunctiva/sclera: Conjunctivae normal.      Pupils: Pupils are equal, round, and reactive to light. Cardiovascular:      Rate and Rhythm: Normal rate and regular rhythm. Heart sounds: Normal heart sounds. No murmur heard. Pulmonary:      Effort: Pulmonary effort is normal. No respiratory distress. Breath sounds: Normal breath sounds. Abdominal:      General: Abdomen is flat. There is no distension. Tenderness: There is no abdominal tenderness. Musculoskeletal:         General: No swelling or tenderness. Normal range of motion. Cervical back: Normal range of motion. No rigidity or tenderness. Skin:     General: Skin is warm and dry. Findings: No rash. Neurological:      General: No focal deficit present. Mental Status: He is alert and oriented to person, place, and time. Cranial Nerves: No cranial nerve deficit. Sensory: No sensory deficit. Motor: No weakness. Coordination: Coordination normal.      Gait: Gait normal.   Psychiatric:         Mood and Affect: Mood normal.         Behavior: Behavior normal.        Procedures     MDM     Patient presented to the Emergency Department for Loss of Consciousness (Pt was at work and had a sudden loss of consciousness.  Pt reports today first day back to work after having covid. )    Patient's workup notable for subarachnoid hemorrhage on non-con CT head. No aneurysm noted on CTA. Blood work unremarkable. Patient's blood pressure treated with labetalol and hydralazine. Trauma surgery consulted. Patient will be admitted to the hospital for further management. EKG: This EKG is signed and interpreted by me. Rate: 94  Rhythm: sinus  Axis: normal  Interpretation: left bundle branch block  Comparison: changes compared to previous EKG. Previous EKG from 2011.         --------------------------------------------- PAST HISTORY ---------------------------------------------  Past Medical History:  has a past medical history of Hypertension, LBBB (left bundle branch block), Obesity, JOANNA on CPAP, and SOB (shortness of breath). Past Surgical History:  has a past surgical history that includes Appendectomy (1994) and hernia repair (1999). Social History:  reports that he has never smoked. He uses smokeless tobacco. He reports that he does not drink alcohol. Family History: family history is not on file. He was adopted. The patients home medications have been reviewed.     Allergies: Paxil [paroxetine] and Pcn [penicillins]    -------------------------------------------------- RESULTS -------------------------------------------------    LABS:  Results for orders placed or performed during the hospital encounter of 08/22/22   CBC with Auto Differential   Result Value Ref Range    WBC 8.9 4.5 - 11.5 E9/L    RBC 5.21 3.80 - 5.80 E12/L    Hemoglobin 14.9 12.5 - 16.5 g/dL    Hematocrit 45.2 37.0 - 54.0 %    MCV 86.8 80.0 - 99.9 fL    MCH 28.6 26.0 - 35.0 pg    MCHC 33.0 32.0 - 34.5 %    RDW 12.6 11.5 - 15.0 fL    Platelets 457 079 - 377 E9/L    MPV 10.4 7.0 - 12.0 fL    Neutrophils % 73.6 43.0 - 80.0 %    Immature Granulocytes % 0.3 0.0 - 5.0 %    Lymphocytes % 19.0 (L) 20.0 - 42.0 %    Monocytes % 5.6 2.0 - 12.0 %    Eosinophils % 0.9 0.0 - 6.0 %    Basophils % 0.6 0.0 - 2.0 %    Neutrophils Absolute 6.56 1.80 - 7.30 E9/L Immature Granulocytes # 0.03 E9/L    Lymphocytes Absolute 1.69 1.50 - 4.00 E9/L    Monocytes Absolute 0.50 0.10 - 0.95 E9/L    Eosinophils Absolute 0.08 0.05 - 0.50 E9/L    Basophils Absolute 0.05 0.00 - 0.20 E9/L   Comprehensive Metabolic Panel w/ Reflex to MG   Result Value Ref Range    Sodium 139 132 - 146 mmol/L    Potassium reflex Magnesium 3.8 3.5 - 5.0 mmol/L    Chloride 103 98 - 107 mmol/L    CO2 24 22 - 29 mmol/L    Anion Gap 12 7 - 16 mmol/L    Glucose 100 (H) 74 - 99 mg/dL    BUN 14 6 - 20 mg/dL    Creatinine 1.1 0.7 - 1.2 mg/dL    GFR Non-African American >60 >=60 mL/min/1.73    GFR African American >60     Calcium 9.5 8.6 - 10.2 mg/dL    Total Protein 7.2 6.4 - 8.3 g/dL    Albumin 4.7 3.5 - 5.2 g/dL    Total Bilirubin 0.4 0.0 - 1.2 mg/dL    Alkaline Phosphatase 96 40 - 129 U/L    ALT 25 0 - 40 U/L    AST 18 0 - 39 U/L   Troponin   Result Value Ref Range    Troponin, High Sensitivity 6 0 - 11 ng/L   Protime-INR   Result Value Ref Range    Protime 12.1 9.3 - 12.4 sec    INR 1.1    APTT   Result Value Ref Range    aPTT 28.5 24.5 - 35.1 sec   URINE DRUG SCREEN   Result Value Ref Range    Amphetamine Screen, Urine NOT DETECTED Negative <1000 ng/mL    Barbiturate Screen, Ur NOT DETECTED Negative < 200 ng/mL    Benzodiazepine Screen, Urine NOT DETECTED Negative < 200 ng/mL    Cannabinoid Scrn, Ur NOT DETECTED Negative < 50ng/mL    Cocaine Metabolite Screen, Urine NOT DETECTED Negative < 300 ng/mL    Opiate Scrn, Ur NOT DETECTED Negative < 300ng/mL    PCP Screen, Urine NOT DETECTED Negative < 25 ng/mL    Methadone Screen, Urine NOT DETECTED Negative <300 ng/mL    Oxycodone Urine NOT DETECTED Negative <100 ng/mL    FENTANYL SCREEN, URINE NOT DETECTED Negative <1 ng/mL    Drug Screen Comment: see below    Serum Drug Screen   Result Value Ref Range    Ethanol Lvl <10 mg/dL    Acetaminophen Level <5.0 (L) 10.0 - 91.5 mcg/mL    Salicylate, Serum <5.8 0.0 - 30.0 mg/dL    TCA Scrn NEGATIVE Cutoff:300 ng/mL   TEG lab test   Result Value Ref Range    R (Reaction Time) 2.9 (L) 5.0 - 10.0 min    K (Clotting Time) 0.9 (L) 1.0 - 3.0 min    Angle (Clot Strength) 76.6 (H) 59.0 - 74.0 degree    MA (Max Amplitude) 71.1 (H) 50.0 - 70.0 mm    G-TEG 12.3 (H) 4.5 - 11.0 K d/sc    EPL-TEG 1.7 0.0 - 15.0 %    LY30 (Fibrinolysis) 1.7 0.0 - 8.0 %   TSH   Result Value Ref Range    TSH 1.360 0.270 - 4.200 uIU/mL   SPECIMEN REJECTION   Result Value Ref Range    Rejected Test TRP5     Reason for Rejection see below    Troponin   Result Value Ref Range    Troponin, High Sensitivity 7 0 - 11 ng/L   EKG 12 Lead   Result Value Ref Range    Ventricular Rate 94 BPM    Atrial Rate 94 BPM    P-R Interval 190 ms    QRS Duration 140 ms    Q-T Interval 404 ms    QTc Calculation (Bazett) 505 ms    P Axis 33 degrees    R Axis 35 degrees    T Axis -83 degrees       RADIOLOGY:  XR PELVIS (1-2 VIEWS)   Final Result   No acute osseous abnormality. CTA HEAD W CONTRAST   Final Result   1. Stable mild subarachnoid hemorrhage along the right frontal sulcations. 2. No evidence of large vessel occlusion or hemodynamically significant   stenosis involving the head and neck arteries. No evidence of aneurysm or   vascular malformation. CTA NECK W CONTRAST   Final Result   1. Stable mild subarachnoid hemorrhage along the right frontal sulcations. 2. No evidence of large vessel occlusion or hemodynamically significant   stenosis involving the head and neck arteries. No evidence of aneurysm or   vascular malformation. CTA PULMONARY W CONTRAST   Final Result   No evidence of pulmonary embolism or acute pulmonary abnormality. RECOMMENDATIONS:   Unavailable         CT HEAD WO CONTRAST   Final Result   CT HEAD WITHOUT CONTRAST:      1. SMALL VOLUME SUBARACHNOID BLOOD is seen along the right anterior   convexity, with probable involvement of the adjacent cortex.   Findings appear   to represent a contrecoup injury, given the presence of a hematoma in the   left posterior scalp. 2. No mass effect or midline shift. 3. No skull fracture. CT CERVICAL SPINE WITHOUT CONTRAST:      1. No fracture or joint dislocation is seen. 2. Degenerative changes, as described. CT CERVICAL SPINE WO CONTRAST   Final Result   CT HEAD WITHOUT CONTRAST:      1. SMALL VOLUME SUBARACHNOID BLOOD is seen along the right anterior   convexity, with probable involvement of the adjacent cortex. Findings appear   to represent a contrecoup injury, given the presence of a hematoma in the   left posterior scalp. 2. No mass effect or midline shift. 3. No skull fracture. CT CERVICAL SPINE WITHOUT CONTRAST:      1. No fracture or joint dislocation is seen. 2. Degenerative changes, as described. XR CHEST (2 VW)   Final Result   No active cardiopulmonary disease.                 ------------------------- NURSING NOTES AND VITALS REVIEWED ---------------------------  Date / Time Roomed:  8/22/2022  8:30 PM  ED Bed Assignment:  [id number]    The nursing notes within the ED encounter and vital signs as below have been reviewed.      Patient Vitals for the past 24 hrs:   BP Temp Temp src Pulse Resp SpO2 Height Weight   08/23/22 1925 (!) 154/92 98.2 °F (36.8 °C) Oral (!) 104 16 96 % -- --   08/23/22 1336 (!) 148/83 -- -- 93 16 97 % 5' 10\" (1.778 m) 219 lb (99.3 kg)   08/23/22 0902 (!) 123/100 -- -- 93 14 -- -- --   08/23/22 0710 96/75 -- -- 89 17 -- -- --   08/23/22 0600 134/79 -- -- 85 10 97 % -- --   08/23/22 0500 -- -- -- 86 16 -- -- --   08/23/22 0400 111/73 -- -- 94 15 -- -- --   08/23/22 0330 117/72 -- -- 90 13 -- -- --   08/23/22 0300 115/74 -- -- 100 16 98 % -- --   08/23/22 0230 127/76 -- -- 82 16 96 % -- --   08/23/22 0200 118/76 -- -- 90 11 99 % -- --   08/23/22 0130 121/72 -- -- 87 17 -- -- --   08/23/22 0100 136/82 -- -- 96 15 98 % -- --   08/23/22 0030 117/75 -- -- 96 19 95 % -- --   08/23/22 0017 127/78 -- -- -- -- -- -- --   08/23/22 0016 127/78 -- -- 100 16 96 % -- --   08/23/22 0000 (!) 143/82 -- -- -- -- -- -- --   08/22/22 2330 (!) 148/80 -- -- -- -- -- -- --   08/22/22 2312 (!) 157/91 -- -- 97 -- 99 % -- --   08/22/22 2300 (!) 148/89 -- -- -- -- -- -- --   08/22/22 2253 (!) 158/87 -- -- 96 -- 98 % -- --   08/22/22 2220 (!) 165/95 -- -- 84 15 98 % -- --   08/22/22 2148 (!) 164/113 -- -- 84 -- 97 % -- --   08/22/22 2115 (!) 169/109 -- -- 84 -- 100 % -- --       Oxygen Saturation Interpretation: Normal    ------------------------------------------ PROGRESS NOTES ------------------------------------------  Re-evaluation(s):  Time: Multiple. Patients symptoms show no change  Repeat physical examination is not changed    Counseling:  I have spoken with the patient and discussed todays results, in addition to providing specific details for the plan of care and counseling regarding the diagnosis and prognosis. Their questions are answered at this time and they are agreeable with the plan of admission.    --------------------------------- ADDITIONAL PROVIDER NOTES ---------------------------------  Consultations:  Time: 0000. Spoke with Dr. Corin Garcia from surgery. Discussed case. They will admit the patient. This patient's ED course included: a personal history and physicial examination, multiple bedside re-evaluations, IV medications, cardiac monitoring, continuous pulse oximetry, and complex medical decision making and emergency management    This patient has remained hemodynamically stable during their ED course. Diagnosis:  1. Syncope and collapse    2.  Subarachnoid hemorrhage ([address]        Disposition:  Patient's disposition: Admit to telemetry  Patient's condition is serious       Illa MD Christine  Resident  08/23/22 2004

## 2022-08-23 NOTE — ED NOTES
Provider aware of patients hypertension.       Nuria Ellis, RN  08/22/22 [address] [facility] [address] Fernandez RN  08/22/22 6625

## 2022-08-23 NOTE — DISCHARGE SUMMARY
Physician Discharge Summary     Patient ID:  Venus Styles  [id number]  45 y.o.  1965    Admit date: 8/22/2022    Discharge date and time: No discharge date for patient encounter. Admitting Physician: Alicia Peng MD     Admission Diagnoses: Trauma [T14.90XA]    Discharge Diagnoses: Principal Problem:    Trauma  Active Problems:    Syncope    SAH (subarachnoid hemorrhage) ([address]  Resolved Problems:    * No resolved hospital problems. *      Admission Condition: poor    Discharged Condition: stable    Indication for Admission: syncopal fall, [address] [place] Course/Procedures/Operation/treatments:   8/22: Trauma consult. Syncopal fall. Found to have 1 [address] that was stable on repeat imaging. Started on Keppra 500 BID. Neurosurgery consulted. 8/23: No new findings on tertiary exam. Neurosurgery to see. PT/OT.  8/24: patient to undergo echo today. Discharged home with outpatient follow up    Consults:   [address] TO NEUROSURGERY    Significant Diagnostic Studies:   XR CHEST (2 VW)    Result Date: 8/22/2022  EXAMINATION: TWO XRAY VIEWS OF THE CHEST 8/22/2022 5:29 pm COMPARISON: Chest, two view 09/05/2017 HISTORY: ORDERING SYSTEM PROVIDED HISTORY: syncope TECHNOLOGIST PROVIDED HISTORY: Reason for exam:->syncope What reading provider will be dictating this exam?->CRC FINDINGS: PA and lateral views of the chest were obtained. Heart, mediastinum, and pulmonary vasculature are within normal limits. There is biapical pleuroparenchymal scarring. Lungs and pleural spaces are otherwise clear. No active cardiopulmonary disease. XR PELVIS (1-2 VIEWS)    Result Date: 8/23/2022  EXAMINATION: ONE XRAY VIEW OF THE PELVIS 8/23/2022 1:05 am COMPARISON: None. HISTORY: ORDERING SYSTEM PROVIDED HISTORY: trauma TECHNOLOGIST PROVIDED HISTORY: Reason for exam:->trauma What reading provider will be dictating this exam?->CRC FINDINGS: There is no evidence of acute fracture.   There is normal alignment. No acute joint abnormality. No focal osseous lesion. No focal soft tissue abnormality. No acute osseous abnormality. CT HEAD WO CONTRAST    Result Date: 8/22/2022  EXAMINATION: CT OF THE HEAD WITHOUT CONTRAST; CT OF THE CERVICAL SPINE WITHOUT CONTRAST 8/22/2022 5:43 pm TECHNIQUE: CT of the head was performed without the administration of intravenous contrast. Automated exposure control, iterative reconstruction, and/or weight based adjustment of the mA/kV was utilized to reduce the radiation dose to as low as reasonably achievable.; CT of the cervical spine was performed without the administration of intravenous contrast. Multiplanar reformatted images are provided for review. Automated exposure control, iterative reconstruction, and/or weight based adjustment of the mA/kV was utilized to reduce the radiation dose to as low as reasonably achievable. COMPARISON: None. HISTORY: ORDERING SYSTEM PROVIDED HISTORY: LOC- [employer] head TECHNOLOGIST PROVIDED HISTORY: Reason for exam:->LOC- [employer] head Has a \"code stroke\" or \"stroke alert\" been called? ->No Decision Support Exception - unselect if not a suspected or confirmed emergency medical condition->Emergency Medical Condition (MA) What reading provider will be dictating this exam?->CRC FINDINGS: CT OF THE BRAIN: BRAIN/VENTRICLES: A small focus of predominantly if not entirely subarachnoid hemorrhage is seen in the anterior right frontal lobe (series 2, image 34). There is likely involvement of the adjacent cortex as well. No significant edema or mass effect is seen. The brain is normal in volume. No hydrocephalus seen. ORBITS: The visualized portion of the orbits demonstrate no acute abnormality. SINUSES: Mild-to-moderate mucosal thickening is seen in the right sphenoid sinus. Mild mucosal thickening in the ethmoid sinuses. SOFT TISSUES/SKULL: The calvarium is intact without fracture or focal lesion. The left posterior scalp hematoma is noted.  CT CERVICAL SPINE: BONES/ALIGNMENT: There is no acute fracture or traumatic malalignment. DEGENERATIVE CHANGES: Moderate loss of disc height with a disc osteophyte complex is noted at C6-7. At C6-7, there is mild central canal stenosis and moderate neural foraminal stenoses. SOFT TISSUES: There is no prevertebral soft tissue swelling. CT HEAD WITHOUT CONTRAST: 1. SMALL VOLUME SUBARACHNOID BLOOD is seen along the right anterior convexity, with probable involvement of the adjacent cortex. Findings appear to represent a contrecoup injury, given the presence of a hematoma in the left posterior scalp. 2. No mass effect or midline shift. 3. No skull fracture. CT CERVICAL SPINE WITHOUT CONTRAST: 1. No fracture or joint dislocation is seen. 2. Degenerative changes, as described. CT CERVICAL SPINE WO CONTRAST    Result Date: 8/22/2022  EXAMINATION: CT OF THE HEAD WITHOUT CONTRAST; CT OF THE CERVICAL SPINE WITHOUT CONTRAST 8/22/2022 5:43 pm TECHNIQUE: CT of the head was performed without the administration of intravenous contrast. Automated exposure control, iterative reconstruction, and/or weight based adjustment of the mA/kV was utilized to reduce the radiation dose to as low as reasonably achievable.; CT of the cervical spine was performed without the administration of intravenous contrast. Multiplanar reformatted images are provided for review. Automated exposure control, iterative reconstruction, and/or weight based adjustment of the mA/kV was utilized to reduce the radiation dose to as low as reasonably achievable. COMPARISON: None. HISTORY: ORDERING SYSTEM PROVIDED HISTORY: LOC- [employer] head TECHNOLOGIST PROVIDED HISTORY: Reason for exam:->LOC- hit head Has a \"code stroke\" or \"stroke alert\" been called? ->No Decision Support Exception - unselect if not a suspected or confirmed emergency medical condition->Emergency Medical Condition (MA) What reading provider will be dictating this exam?->CRC FINDINGS: CT OF THE the neck was performed with the administration of intravenous contrast. Multiplanar reformatted images are provided for review. MIP images are provided for review. Stenosis of the internal carotid arteries measured using NASCET criteria. Automated exposure control, iterative reconstruction, and/or weight based adjustment of the mA/kV was utilized to reduce the radiation dose to as low as reasonably achievable. Noncontrast CT of the head with reconstructed 2-D images are also provided for review. COMPARISON: Head CT from earlier the same day. HISTORY: ORDERING SYSTEM PROVIDED HISTORY: fall, subarachnoid hemorrhage on non-con head TECHNOLOGIST PROVIDED HISTORY: Reason for exam:->fall, subarachnoid hemorrhage on non-con head Has a \"code stroke\" or \"stroke alert\" been called? ->No Decision Support Exception - unselect if not a suspected or confirmed emergency medical condition->Emergency Medical Condition (MA) What reading provider will be dictating this exam?->CRC; ORDERING SYSTEM PROVIDED HISTORY: fall, subarachnoid hemorrhage TECHNOLOGIST PROVIDED HISTORY: Reason for exam:->fall, subarachnoid hemorrhage Has a \"code stroke\" or \"stroke alert\" been called? ->No Decision Support Exception - unselect if not a suspected or confirmed emergency medical condition->Emergency Medical Condition (MA) What reading provider will be dictating this exam?->CRC FINDINGS: CT HEAD: BRAIN/VENTRICLES:  Stable mild subarachnoid hemorrhage along the right frontal sulcations. Differentiation is maintained. No evidence of mass, mass effect or midline shift. No evidence of hydrocephalus. ORBITS: The visualized portion of the orbits demonstrate no acute abnormality. SINUSES:  Mild-to-moderate circumferential mucosal thickening involving the right maxillary sinus. The remainder of the visualized paranasal sinuses and mastoid air cells demonstrate no acute abnormality.  SOFT TISSUES/SKULL: No acute abnormality of the visualized skull or soft tissues. CTA NECK: AORTIC ARCH/ARCH VESSELS: No dissection or arterial injury. No significant stenosis of the brachiocephalic or subclavian arteries. CAROTID ARTERIES: No dissection, arterial injury, or hemodynamically significant stenosis by NASCET criteria. VERTEBRAL ARTERIES: No dissection, arterial injury, or significant stenosis. SOFT TISSUES: The lung apices are clear. No cervical or superior mediastinal lymphadenopathy. The larynx and pharynx are unremarkable. No acute abnormality of the salivary and thyroid glands. BONES: No acute osseous abnormality. CTA HEAD: ANTERIOR CIRCULATION: No significant stenosis of the intracranial internal carotid, anterior cerebral, or middle cerebral arteries. No aneurysm. POSTERIOR CIRCULATION: No significant stenosis of the vertebral, basilar, or posterior cerebral arteries. No aneurysm. OTHER: No dural venous sinus thrombosis on this non-dedicated study. 1. Stable mild subarachnoid hemorrhage along the right frontal sulcations. 2. No evidence of large vessel occlusion or hemodynamically significant stenosis involving the head and neck arteries. No evidence of aneurysm or vascular malformation. CTA PULMONARY W CONTRAST    Result Date: 8/22/2022  EXAMINATION: CTA OF THE CHEST 8/22/2022 10:49 pm TECHNIQUE: CTA of the chest was performed after the administration of intravenous contrast.  Multiplanar reformatted images are provided for review. MIP images are provided for review. Automated exposure control, iterative reconstruction, and/or weight based adjustment of the mA/kV was utilized to reduce the radiation dose to as low as reasonably achievable. COMPARISON: None.  HISTORY: ORDERING SYSTEM PROVIDED HISTORY: pe TECHNOLOGIST PROVIDED HISTORY: Reason for exam:->pe Decision Support Exception - unselect if not a suspected or confirmed emergency medical condition->Emergency Medical Condition (MA) What reading provider will be dictating this exam?->CRC FINDINGS: Pulmonary Arteries: Pulmonary arteries are adequately opacified for evaluation. No evidence of intraluminal filling defect to suggest pulmonary embolism. Main pulmonary artery is normal in caliber. Mediastinum: No evidence of mediastinal lymphadenopathy. The heart and pericardium demonstrate no acute abnormality. There is no acute abnormality of the thoracic aorta. Lungs/pleura: The lungs are without acute process. No focal consolidation or pulmonary edema. No evidence of pleural effusion or pneumothorax. Upper Abdomen: Limited images of the upper abdomen are unremarkable. Soft Tissues/Bones: No acute bone or soft tissue abnormality. No evidence of pulmonary embolism or acute pulmonary abnormality. RECOMMENDATIONS: Unavailable     CTA HEAD W CONTRAST    Result Date: 8/22/2022  EXAMINATION: CTA OF THE HEAD WITH CONTRAST; CTA OF THE NECK 8/22/2022 10:49 pm: TECHNIQUE: CTA of the head/brain was performed with the administration of intravenous contrast. Multiplanar reformatted images are provided for review. MIP images are provided for review. Automated exposure control, iterative reconstruction, and/or weight based adjustment of the mA/kV was utilized to reduce the radiation dose to as low as reasonably achievable.; CTA of the neck was performed with the administration of intravenous contrast. Multiplanar reformatted images are provided for review. MIP images are provided for review. Stenosis of the internal carotid arteries measured using NASCET criteria. Automated exposure control, iterative reconstruction, and/or weight based adjustment of the mA/kV was utilized to reduce the radiation dose to as low as reasonably achievable. Noncontrast CT of the head with reconstructed 2-D images are also provided for review. COMPARISON: Head CT from earlier the same day.  HISTORY: ORDERING SYSTEM PROVIDED HISTORY: fall, subarachnoid hemorrhage on non-con head TECHNOLOGIST PROVIDED HISTORY: Reason for exam:->fall, subarachnoid hemorrhage on non-con head Has a \"code stroke\" or \"stroke alert\" been called? ->No Decision Support Exception - unselect if not a suspected or confirmed emergency medical condition->Emergency Medical Condition (MA) What reading provider will be dictating this exam?->CRC; ORDERING SYSTEM PROVIDED HISTORY: fall, subarachnoid hemorrhage TECHNOLOGIST PROVIDED HISTORY: Reason for exam:->fall, subarachnoid hemorrhage Has a \"code stroke\" or \"stroke alert\" been called? ->No Decision Support Exception - unselect if not a suspected or confirmed emergency medical condition->Emergency Medical Condition (MA) What reading provider will be dictating this exam?->CRC FINDINGS: CT HEAD: BRAIN/VENTRICLES:  Stable mild subarachnoid hemorrhage along the right frontal sulcations. Differentiation is maintained. No evidence of mass, mass effect or midline shift. No evidence of hydrocephalus. ORBITS: The visualized portion of the orbits demonstrate no acute abnormality. SINUSES:  Mild-to-moderate circumferential mucosal thickening involving the right maxillary sinus. The remainder of the visualized paranasal sinuses and mastoid air cells demonstrate no acute abnormality. SOFT TISSUES/SKULL: No acute abnormality of the visualized skull or soft tissues. CTA NECK: AORTIC ARCH/ARCH VESSELS: No dissection or arterial injury. No significant stenosis of the brachiocephalic or subclavian arteries. CAROTID ARTERIES: No dissection, arterial injury, or hemodynamically significant stenosis by NASCET criteria. VERTEBRAL ARTERIES: No dissection, arterial injury, or significant stenosis. SOFT TISSUES: The lung apices are clear. No cervical or superior mediastinal lymphadenopathy. The larynx and pharynx are unremarkable. No acute abnormality of the salivary and thyroid glands. BONES: No acute osseous abnormality.  CTA HEAD: ANTERIOR CIRCULATION: No significant stenosis of the intracranial internal carotid, anterior cerebral, or middle cerebral arteries. No aneurysm. POSTERIOR CIRCULATION: No significant stenosis of the vertebral, basilar, or posterior cerebral arteries. No aneurysm. OTHER: No dural venous sinus thrombosis on this non-dedicated study. 1. Stable mild subarachnoid hemorrhage along the right frontal sulcations. 2. No evidence of large vessel occlusion or hemodynamically significant stenosis involving the head and neck arteries. No evidence of aneurysm or vascular malformation. Discharge Exam:  HEAD: Normocephalic, atraumatic  EYES: No sclera icterus, pupils equal  LUNGS:  No increased work of breathing. room air. CARDIOVASCULAR:  regular rate   ABDOMEN:  Soft, non-tender, non-distended  EXTREMITIES: No edema or swelling  SKIN: Warm and dry    Disposition: home    In process/preliminary results:  Outstanding Order Results       No orders found from 7/24/2022 to 8/23/2022. Patient Instructions:   Current Discharge Medication List             Details   levETIRAcetam (KEPPRA) 500 MG tablet Take 1 tablet by mouth 2 times daily for 10 doses  Qty: 10 tablet, Refills: 0             TRAUMA SERVICES DISCHARGE INSTRUCTIONS    Call [phone], option 2, for any questions/concerns and for follow-up appointment in 2 week(s). Please follow the instructions checked below:    Please follow-up with your primary care provider. ACTIVITY INSTRUCTIONS  Increase activity as tolerated  No heavy lifting or strenuous activity  Take your incentive spirometer home and use 4-6 times/day   [x]  No driving until cleared by neurosurgeon    WOUND/DRESSING INSTRUCTIONS:  You may shower. No sitting in bath tub, hot tub or swimming until cleared by physician. Ice to areas of pain for first 24 hours. Heat to areas of pain after that. Wash areas of lacerations/abrasions with soap & water. Rinse well. Pat dry with clean towel. Apply thin layer of Bacitracin, Neosporin, or triple antibiotic cream to affected area 2-3 times per day. Keep wounds clean and dry. MEDICATION INSTRUCTIONS  Take medication as prescribed. When taking pain medications, you may experience dizziness or drowsiness. Do not drink alcohol or drive when taking these medications. You may experience constipation while taking pain medication. You may take over the counter stool softeners such as docusate (Colace), sennosides S (Senokot-S), or Miralax. [x]  You may take Ibuprofen (over the counter) as directed for mild pain. --You may take up to 800mg every 8 hours for pain, please take with food or milk. [x]  You may take acetaminophen (Tylenol) products. Do NOT take more than 4000mg of Tylenol in 24h. [x]  Do not take any other acetaminophen (Tylenol) products if you are taking Percocet or Norco, as these contain Tylenol. --Do NOT take more than 4000mg of Tylenol in 24h. OPIOID MEDICATION INSTRUCTIONS  Read the medication guide that is included with your prescription. Take your medication exactly as prescribed. Store medication away from children and in a safe place. Do NOT share your medication with others. Do NOT take medication unless it is prescribed for you. Do NOT drink alcohol while taking opioids (I.e., Norco, Percocet, Oxycodone, etc). Discuss with the Trauma Clinic staff if the dose of medication you are taking does not control your pain and any side effects that you may be having. CALL 911 OR YOUR LOCAL EMERGENCY SERVICE:  --If you take too much medication  --If you have trouble breathing or shortness of breath  --A child has taken this medication. WORK:  You may not return to work until you receive follow-up with the Trauma Clinic or clearance by all consultants. Call the trauma clinic for any of the following or for questions/concerns;  --fever over 101F  --redness, swelling, hardness or warmth at the wound site(s).   --Unrelieved nausea/vomiting  --Foul smelling or cloudy drainage at the wound site(s)  --Unrelieved pain or

## 2022-08-23 NOTE — PROGRESS NOTES
Trauma Tertiary Survey    Admit Date: 8/22/20223    Hospital day 1    CC:  syncopal fall       Past Medical History:   Diagnosis Date    Hypertension     LBBB (left bundle branch block)     Obesity     JOANNA on CPAP 2001    SOB (shortness of breath) 2001       Alcohol pre-screening:  Men: How many times in the past year have you had 5 or more drinks in a day?  none    How much do you drink on a daily basis? none    Scheduled Meds:   levETIRAcetam  500 mg Oral BID     Continuous Infusions:  PRN Meds:perflutren lipid microspheres, hydrALAZINE    Subjective:     Patient reports no pain this morning. Objective:   Patient Vitals for the past 8 hrs:   BP Pulse Resp SpO2   08/23/22 0400 111/73 94 15 --   08/23/22 0330 117/72 90 13 --   08/23/22 0300 115/74 100 16 98 %   08/23/22 0230 127/76 82 16 96 %   08/23/22 0200 118/76 90 11 99 %   08/23/22 0130 121/72 87 17 --   08/23/22 0100 136/82 96 15 98 %   08/23/22 0030 117/75 96 19 95 %   08/23/22 0017 127/78 -- -- --   08/23/22 0016 127/78 100 16 96 %   08/23/22 0000 (!) 143/82 -- -- --   08/22/22 2330 (!) 148/80 -- -- --   08/22/22 2312 (!) 157/91 97 -- 99 %   08/22/22 2300 (!) 148/89 -- -- --   08/22/22 2253 (!) 158/87 96 -- 98 %   08/22/22 2220 (!) 165/95 84 15 98 %   08/22/22 2148 (!) 164/113 84 -- 97 %   08/22/22 2115 (!) 169/109 84 -- 100 %       Past Medical History:   Diagnosis Date    Hypertension     LBBB (left bundle branch block)     Obesity     JOANNA on CPAP 2001    SOB (shortness of breath) 2001       Radiology:  XR PELVIS (1-2 VIEWS)   Final Result   No acute osseous abnormality. CTA HEAD W CONTRAST   Final Result   1. Stable mild subarachnoid hemorrhage along the right frontal sulcations. 2. No evidence of large vessel occlusion or hemodynamically significant   stenosis involving the head and neck arteries. No evidence of aneurysm or   vascular malformation. CTA NECK W CONTRAST   Final Result   1.  Stable mild subarachnoid hemorrhage along the right frontal sulcations. 2. No evidence of large vessel occlusion or hemodynamically significant   stenosis involving the head and neck arteries. No evidence of aneurysm or   vascular malformation. CTA PULMONARY W CONTRAST   Final Result   No evidence of pulmonary embolism or acute pulmonary abnormality. RECOMMENDATIONS:   Unavailable         CT HEAD WO CONTRAST   Final Result   CT HEAD WITHOUT CONTRAST:      1. SMALL VOLUME SUBARACHNOID BLOOD is seen along the right anterior   convexity, with probable involvement of the adjacent cortex. Findings appear   to represent a contrecoup injury, given the presence of a hematoma in the   left posterior scalp. 2. No mass effect or midline shift. 3. No skull fracture. CT CERVICAL SPINE WITHOUT CONTRAST:      1. No fracture or joint dislocation is seen. 2. Degenerative changes, as described. CT CERVICAL SPINE WO CONTRAST   Final Result   CT HEAD WITHOUT CONTRAST:      1. SMALL VOLUME SUBARACHNOID BLOOD is seen along the right anterior   convexity, with probable involvement of the adjacent cortex. Findings appear   to represent a contrecoup injury, given the presence of a hematoma in the   left posterior scalp. 2. No mass effect or midline shift. 3. No skull fracture. CT CERVICAL SPINE WITHOUT CONTRAST:      1. No fracture or joint dislocation is seen. 2. Degenerative changes, as described. XR CHEST (2 VW)   Final Result   No active cardiopulmonary disease.              PHYSICAL EXAM:   GCS:    4 - Opens eyes on own   6 - Follows simple motor commands  5 - Alert and oriented      Pupil size:  Left 3 mm Right 3 mm  Pupil reaction: Yes  Wiggles fingers: Left yes Right yes  Hand grasp:   Left yes  Right yes  Wiggles toes: Left yes   Right yes  Plantar flexion: Left yes Right yes    PHYSICAL EXAM   General: No apparent distress, comfortable  HEENT: Trachea midline, no masses, Pupils equal round  Chest: Respiratory effort was normal with no retractions or use of accessory muscles. RA, SMI:2500  Cardiovascular: Extremities warm, well perfused  Abdomen:  Soft and non distended. No tenderness, guarding, rebound, or rigidity  Extremities: Moves all 4 extremities, No pedal edema    Spine:     Spine Tenderness ROM   Cervical 0 /10 Normal   Thoracic 0 /10 Normal   Lumbar 0 /10 Normal     Musculoskeletal    Joint Tenderness Swelling ROM   Right shoulder absent absent normal   Left shoulder absent absent normal   Right elbow absent absent normal   Left elbow absent absent normal   Right wrist absent absent normal   Left wrist absent absent normal   Right hand grasp absent absent normal   Left hand grasp absent absent normal   Right hip absent absent normal   Left hip absent absent normal   Right knee absent absent normal   Left knee absent absent normal   Right ankle absent absent normal   Left ankle absent absent normal   Right foot absent absent normal   Left foot absent absent normal       CONSULTS: Neurosurgery    PROCEDURES: none    INJURIES:        Principal Problem:    Trauma  Active Problems:    Syncope    SAH (subarachnoid hemorrhage) ([facility])  Resolved Problems:    * No resolved hospital problems. *        Assessment/Plan:       Neuro:   Continue to monitor neuro status. SAH- stable on repeat head CT. Keppra 500 BID. Neurosurgery consulted. CV:  Monitor hemodynamics. Syncopal work-up: orthostatics, EKG, troponin, ECHO  Pulm: Monitor RR and SpO2, pulmonary hygiene, SMI 2500  GI:  Diet, monitor bowel function  Renal: Monitor BUN/Cr/UOP  ID:  No indications for antibiotics  Endocrine: Maintain blood glucose < 180. TSH WNL. MSK: PT/OT when able  Heme: Daily CBC    Bowel regime: Glycolax  Pain control/Sedation: Tylenol  DVT prophylaxis: SCD's    GI: diet  Mouth/Eye care: Per patient  Delgado: none    Code status:    No Order  Patient/Family update:  As available    Disposition:  monitored floor.     Electronically signed by Bg Jerry MD on 8/23/22 at 5:01 AM EDT

## 2022-08-23 NOTE — H&P
TRAUMA HISTORY & PHYSICAL  Surgical Resident/Advance Practice Nurse  8/23/2022  1:05 AM    PRIMARY SURVEY    CHIEF COMPLAINT:  Trauma consult. Injury occurred 3 PM yesterday afternoon. Patient was reportedly at work. He had a syncopal fall. He is amnestic to the event. He thinks that he hit his head. He reports loss of consciousness. He has had multiple prior similar episodes in the past.  He states that he thinks that he saw a doctor for these in 2011 and he was told that he was dehydrated. He is GCS 15 and neurologically intact. He is not on anticoagulation. Patient reportedly has a known left bundle branch block and occasional PVCs. He does not see anyone for these in a long time. He underwent CT head which revealed frontal SAH. AIRWAY:   Airway Normal  EMS ETT Absent  Noisy respirations Absent  Retractions: Absent  Vomiting/bleeding: Absent      BREATHING:    Midaxillary breath sound left:  Normal  Midaxillary breath sound right:  Normal    Cough sound intensity:  good   FiO2:  Room air    SMI 2500 mL.       CIRCULATION:   Femerol pulse intensity: Strong  Palpebral conjunctiva: Pink     Vitals:    08/23/22 0017   BP: 127/78   Pulse:    Resp:    Temp:    SpO2:        Vitals:    08/22/22 2330 08/23/22 0000 08/23/22 0016 08/23/22 0017   BP: (!) 148/80 (!) 143/82 127/78 127/78   Pulse:   100    Resp:   16    Temp:       TempSrc:       SpO2:   96%         FAST EXAM: Deferred    Central Nervous System    GCS Initial 15 minutes   Eye  Motor  Verbal 4 - Opens eyes on own  6 - Follows simple motor commands  5 - Alert and oriented 4 - Opens eyes on own  6 - Follows simple motor commands  5 - Alert and oriented     Neuromuscular blockade: No  Pupil size:  Left 3 mm    Right 3 mm  Pupil reaction: Yes    Wiggles fingers: Left Yes Right Yes  Wiggles toes: Left Yes   Right Yes    Hand grasp:   Left  Present      Right  Present  Plantar flexion: Left  Present      Right   Present    Loss of consciousness: Yes    History Obtained From:  Patient & wife  Private Medical Doctor: Kristina Conrad DO    Pre-exisiting Medical History:  yes    Conditions:   Past Medical History:   Diagnosis Date    Hypertension     LBBB (left bundle branch block)     Obesity     JOANNA on CPAP 2001    SOB (shortness of breath) 2001       Medications:   No current facility-administered medications on file prior to encounter. Current Outpatient Medications on File Prior to Encounter   Medication Sig Dispense Refill    Multiple Vitamins-Minerals (THERAPEUTIC MULTIVITAMIN-MINERALS) tablet Take 1 tablet by mouth daily         Allergies: Allergies   Allergen Reactions    Paxil [Paroxetine]     Pcn [Penicillins]        Social History:   Tobacco use:  none  Alcohol use:  none  Illicit drug use:  no history of illicit drug use    Past Surgical History:    Past Surgical History:   Procedure Laterality Date    APPENDECTOMY  1994    HERNIA REPAIR  1999    abdominal hernia       Anticoagulant use: None  Antiplatelet use:   None    NSAID use in last 72 hours: yes  Taken PCN in past:  yes- allergic  Last food/drink: yesterday  Last tetanus: unknown    Family History:   No family history of anesthesia complications    Complaints:   Head:  Mild  Neck:   None  Chest:   None  Back:   None  Abdomen:   None  Extremities:   None  Comments: none    Review of systems:  All negative unless otherwise noted. SECONDARY SURVEY  Head/scalp: Atraumatic    Face: Atraumatic    Eyes/ears/nose: Atraumatic    Pharynx/mouth: Atraumatic    Neck: Atraumatic     Cervical spine tenderness:   Cervical collar not indicated  Pain:  none  ROM:  Normal    Chest wall:  Atraumatic    Heart:  Regular rate & rhythm    Abdomen: Atraumatic. Soft ND  Tenderness:  none    Pelvis: Atraumatic  Tenderness: none    Thoracolumbar spine: Atraumatic  Tenderness:  none    Genitourinary:  Atraumatic. No blood or urine noted    Rectum: Atraumatic. No blood noted. Perineum: Atraumatic. No blood or urine noted. Extremities:   Sensory normal  Motor normal    Distal Pulses  Left arm normal  Right arm normal  Left leg normal  Right leg normal    Capillary refill  Left arm normal  Right arm normal  Left leg normal  Right leg normal    Procedures in ED:   none    In the event of Emergency Blood Transfusion:  Due to the critical condition of this patient, I request the immediate release of blood products for emergency transfusion secondary to shock. I understand the increased risks incurred by the lack of complete transfusion testing.       Radiology: CT head, CT c-spine, CTA head, CTA neck, CTA pulm, CXR    Consultations:  Neurosurgery    Admission/Diagnosis: SAH, syncopal fall    Plan of Treatment:  Admit to monitored floor  Keppra 500 twice daily  Neurosurgery consultation  Serial troponin  EKG in ED representing intraventricular block  TSH  Echocardiogram  Orthostatics  Pelvic xray    Plan discussed with Dr. Stevan Clements at 8/23/2022 on 1:05 AM    Electronically signed by Melissa Kaiser MD on 8/23/2022 at 1:05 AM

## 2022-08-24 VITALS
SYSTOLIC BLOOD PRESSURE: 123 MMHG | HEART RATE: 109 BPM | DIASTOLIC BLOOD PRESSURE: 89 MMHG | WEIGHT: 219 LBS | RESPIRATION RATE: 16 BRPM | OXYGEN SATURATION: 95 % | HEIGHT: 70 IN | TEMPERATURE: 98.2 F | BODY MASS INDEX: 31.35 KG/M2

## 2022-08-24 LAB
ANION GAP SERPL CALCULATED.3IONS-SCNC: 11 MMOL/L (ref 7–16)
BASOPHILS ABSOLUTE: 0.05 E9/L (ref 0–0.2)
BASOPHILS RELATIVE PERCENT: 0.5 % (ref 0–2)
BUN BLDV-MCNC: 15 MG/DL (ref 6–20)
CALCIUM SERPL-MCNC: 9.2 MG/DL (ref 8.6–10.2)
CHLORIDE BLD-SCNC: 101 MMOL/L (ref 98–107)
CO2: 25 MMOL/L (ref 22–29)
CREAT SERPL-MCNC: 1.2 MG/DL (ref 0.7–1.2)
EOSINOPHILS ABSOLUTE: 0.18 E9/L (ref 0.05–0.5)
EOSINOPHILS RELATIVE PERCENT: 2 % (ref 0–6)
GFR AFRICAN AMERICAN: >60
GFR NON-AFRICAN AMERICAN: >60 ML/MIN/1.73
GLUCOSE BLD-MCNC: 98 MG/DL (ref 74–99)
HCT VFR BLD CALC: 42.6 % (ref 37–54)
HEMOGLOBIN: 14.5 G/DL (ref 12.5–16.5)
IMMATURE GRANULOCYTES #: 0.04 E9/L
IMMATURE GRANULOCYTES %: 0.4 % (ref 0–5)
LV EF: 50 %
LVEF MODALITY: NORMAL
LYMPHOCYTES ABSOLUTE: 2.24 E9/L (ref 1.5–4)
LYMPHOCYTES RELATIVE PERCENT: 24.5 % (ref 20–42)
MCH RBC QN AUTO: 28.4 PG (ref 26–35)
MCHC RBC AUTO-ENTMCNC: 34 % (ref 32–34.5)
MCV RBC AUTO: 83.5 FL (ref 80–99.9)
MONOCYTES ABSOLUTE: 0.84 E9/L (ref 0.1–0.95)
MONOCYTES RELATIVE PERCENT: 9.2 % (ref 2–12)
NEUTROPHILS ABSOLUTE: 5.78 E9/L (ref 1.8–7.3)
NEUTROPHILS RELATIVE PERCENT: 63.4 % (ref 43–80)
PDW BLD-RTO: 12.9 FL (ref 11.5–15)
PLATELET # BLD: 234 E9/L (ref 130–450)
PMV BLD AUTO: 10.6 FL (ref 7–12)
POTASSIUM REFLEX MAGNESIUM: 4.2 MMOL/L (ref 3.5–5)
RBC # BLD: 5.1 E12/L (ref 3.8–5.8)
SODIUM BLD-SCNC: 137 MMOL/L (ref 132–146)
WBC # BLD: 9.1 E9/L (ref 4.5–11.5)

## 2022-08-24 PROCEDURE — 85025 COMPLETE CBC W/AUTO DIFF WBC: CPT

## 2022-08-24 PROCEDURE — 36415 COLL VENOUS BLD VENIPUNCTURE: CPT

## 2022-08-24 PROCEDURE — 2580000003 HC RX 258: Performed by: STUDENT IN AN ORGANIZED HEALTH CARE EDUCATION/TRAINING PROGRAM

## 2022-08-24 PROCEDURE — 6370000000 HC RX 637 (ALT 250 FOR IP): Performed by: STUDENT IN AN ORGANIZED HEALTH CARE EDUCATION/TRAINING PROGRAM

## 2022-08-24 PROCEDURE — 99238 HOSP IP/OBS DSCHRG MGMT 30/<: CPT | Performed by: SURGERY

## 2022-08-24 PROCEDURE — 93306 TTE W/DOPPLER COMPLETE: CPT

## 2022-08-24 PROCEDURE — 80048 BASIC METABOLIC PNL TOTAL CA: CPT

## 2022-08-24 RX ORDER — LEVETIRACETAM 500 MG/1
500 TABLET ORAL 2 TIMES DAILY
Qty: 10 TABLET | Refills: 0 | Status: SHIPPED | OUTPATIENT
Start: 2022-08-24 | End: 2022-09-28 | Stop reason: ALTCHOICE

## 2022-08-24 RX ADMIN — LEVETIRACETAM 500 MG: 500 TABLET, FILM COATED ORAL at 09:36

## 2022-08-24 RX ADMIN — BISACODYL 5 MG: 5 TABLET, COATED ORAL at 09:36

## 2022-08-24 RX ADMIN — POLYETHYLENE GLYCOL 3350 17 G: 17 POWDER, FOR SOLUTION ORAL at 09:36

## 2022-08-24 RX ADMIN — Medication 10 ML: at 09:36

## 2022-08-24 NOTE — DISCHARGE INSTRUCTIONS
TRAUMA SERVICES DISCHARGE INSTRUCTIONS    Call [phone], option 2, for any questions/concerns and for follow-up appointment in 2 week(s). Please follow the instructions checked below:    Please follow-up with your primary care provider. ACTIVITY INSTRUCTIONS  Increase activity as tolerated  No heavy lifting or strenuous activity  Take your incentive spirometer home and use 4-6 times/day   [x]  No driving until cleared by neurosurgeon    WOUND/DRESSING INSTRUCTIONS:  You may shower. No sitting in bath tub, hot tub or swimming until cleared by physician. Ice to areas of pain for first 24 hours. Heat to areas of pain after that. Wash areas of lacerations/abrasions with soap & water. Rinse well. Pat dry with clean towel. Apply thin layer of Bacitracin, Neosporin, or triple antibiotic cream to affected area 2-3 times per day. Keep wounds clean and dry. MEDICATION INSTRUCTIONS  Take medication as prescribed. When taking pain medications, you may experience dizziness or drowsiness. Do not drink alcohol or drive when taking these medications. You may experience constipation while taking pain medication. You may take over the counter stool softeners such as docusate (Colace), sennosides S (Senokot-S), or Miralax. [x]  You may take Ibuprofen (over the counter) as directed for mild pain. --You may take up to 800mg every 8 hours for pain, please take with food or milk. [x]  You may take acetaminophen (Tylenol) products. Do NOT take more than 4000mg of Tylenol in 24h. [x]  Do not take any other acetaminophen (Tylenol) products if you are taking Percocet or Norco, as these contain Tylenol. --Do NOT take more than 4000mg of Tylenol in 24h. OPIOID MEDICATION INSTRUCTIONS  Read the medication guide that is included with your prescription. Take your medication exactly as prescribed. Store medication away from children and in a safe place. Do NOT share your medication with others.   Do NOT take medication unless it is prescribed for you. Do NOT drink alcohol while taking opioids (I.e., Norco, Percocet, Oxycodone, etc). Discuss with the Trauma Clinic staff if the dose of medication you are taking does not control your pain and any side effects that you may be having. CALL 911 OR YOUR LOCAL EMERGENCY SERVICE:  --If you take too much medication  --If you have trouble breathing or shortness of breath  --A child has taken this medication. WORK:  You may not return to work until you receive follow-up with the Trauma Clinic or clearance by all consultants. Call the trauma clinic for any of the following or for questions/concerns;  --fever over 101F  --redness, swelling, hardness or warmth at the wound site(s).   --Unrelieved nausea/vomiting  --Foul smelling or cloudy drainage at the wound site(s)  --Unrelieved pain or increase in pain  --Increase in shortness of breath    Follow-up:  Trauma Clinic: [phone] johana parikh, [id number] [address]

## 2022-08-24 NOTE — PROGRESS NOTES
[facility]. Daily Progress Note 8/24/2022    Date of admission:  8/22/2022    CC: syncopal fall     Subjective:  No issues overnight. Mild headache     Objective:  BP (!) 154/92   Pulse (!) 104   Temp 98.2 °F (36.8 °C) (Oral)   Resp 16   Ht 5' 10\" (1.778 m)   Wt 219 lb (99.3 kg)   SpO2 96%   BMI 31.42 kg/m²     GENERAL:  Laying in bed, awake, alert, cooperative, no apparent distress    NEUROLOGIC:  GCS 15, no deficits     HEAD: Normocephalic, atraumatic  EYES: No sclera icterus, pupils equal  LUNGS:  No increased work of breathing. room air. CARDIOVASCULAR:  regular rate   ABDOMEN:  Soft, non-tender, non-distended  EXTREMITIES: No edema or swelling  SKIN: Warm and dry    Assessment/Plan:  60 y.o. male s/p syncopal fall     Principal Problem:    Trauma  Active Problems:    Syncope    SAH (subarachnoid hemorrhage) ([facility])  Resolved Problems:    * No resolved hospital problems. *      Neuro:   Continue to monitor neuro status. SAH- stable on repeat head CT. Keppra 500 BID. Neurosurgery consulted, recs appreciated   CV: Monitor hemodynamics. Syncopal work-up: orthostatics, EKG, troponin, ECHO - known LBBB  Pulm: Monitor RR and SpO2, pulmonary hygiene, SMI 2500  GI:  Diet, monitor bowel function  Renal: Monitor BUN/Cr/UOP  ID:  No indications for antibiotics  Endocrine: Maintain blood glucose < 180. TSH WNL. MSK: WBAT all extremities. [facility] 24/24   Heme: Daily CBC    DVT Prophylaxis: PCDs  Ulcer Prophylaxis: n/a . Tubes and Lines:  Peripheral  Ancillary consults:   Neurosurgery   Family Update:         As available   CODE Status:   Full code  Dispo:Home pending Echo     Leslye Seip, MD    Attending Physician Statement:  I have examined the patient, reviewed the record, and discussed the case with the surgical team.  I agree with the assessment and plan with the following additions, corrections, and changes.     Mild scalp pain  Sitting up in bedside chair  Seen by NSGY  Awaiting TTE for syncope eval - discharge pending  Wife at bedside  If TTE ok then may Discharge home today.      Electronically signed by Marina Veras MD on 8/24/22 at 11:37 AM EDT

## 2022-08-24 NOTE — PROGRESS NOTES
Occupational Therapy  Received OT orders, Reviewed Chart. Spoke to pt who reported being IND in room w/ all ADLs, Functional Transfers and Functional Mobility w/o the use of any DME/AD. Pt denied having any HA, Chest Pain, Dizziness/Lightheadedness or change in vision at rest nor w/ upright ax. Confirmed this w/ Nsg.   Pt and Therapist agreed pt does not have any need for OT services at this time. Will D/C order - please re-order should any needs arise.   Thank you for this referral. RAYRAY Machado, OTR/L  # [phone]

## 2022-08-24 NOTE — PROGRESS NOTES
PCP is Kathy Urena DO  Office notified of admission.       Electronically signed by Annette Arroyo RN MSN APRN-NP [place] NP  [facility] 8/24/2022 12:18 PM

## 2022-08-24 NOTE — CARE COORDINATION
8/24/2022 - Care Coordination - admitted for trauma, syncope, subarachnoid hemorrhage. Neurosurgery and trauma following. Echo ordered. Pt up ambulating in hallway with out difficulty. His PCP is retired and he will be going to Dr [facility] Gu[facility] Sharif - he needs to make an appointment with her. Pharmacy is [facility]. Lives with his wife and children in a split level home with ) steps to enter. Denies hx HHC, VIRGEN/ARU or DME. 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 His plan is to discharge home when medically stable. His family will provide transportation home. Called echo department to inform them that echo needs done for pending discharge.  KRAIG/LEONARDO

## 2022-08-24 NOTE — PROGRESS NOTES
Neurosurg progress note  VITALS:  BP (!) 154/92   Pulse (!) 104   Temp 98.2 °F (36.8 °C) (Oral)   Resp 16   Ht 5' 10\" (1.778 m)   Wt 219 lb (99.3 kg)   SpO2 96%   BMI 31.42 kg/m²   24HR INTAKE/OUTPUT:  No intake or output data in the 24 hours ending 08/24/22 0943  XR CHEST (2 VW)    Result Date: 8/22/2022  EXAMINATION: TWO XRAY VIEWS OF THE CHEST 8/22/2022 5:29 pm COMPARISON: Chest, two view 09/05/2017 HISTORY: ORDERING SYSTEM PROVIDED HISTORY: syncope TECHNOLOGIST PROVIDED HISTORY: Reason for exam:->syncope What reading provider will be dictating this exam?->CRC FINDINGS: PA and lateral views of the chest were obtained. Heart, mediastinum, and pulmonary vasculature are within normal limits. There is biapical pleuroparenchymal scarring. Lungs and pleural spaces are otherwise clear. No active cardiopulmonary disease. XR PELVIS (1-2 VIEWS)    Result Date: 8/23/2022  EXAMINATION: ONE XRAY VIEW OF THE PELVIS 8/23/2022 1:05 am COMPARISON: None. HISTORY: ORDERING SYSTEM PROVIDED HISTORY: trauma TECHNOLOGIST PROVIDED HISTORY: Reason for exam:->trauma What reading provider will be dictating this exam?->CRC FINDINGS: There is no evidence of acute fracture. There is normal alignment. No acute joint abnormality. No focal osseous lesion. No focal soft tissue abnormality. No acute osseous abnormality. CT HEAD WO CONTRAST    Result Date: 8/22/2022  EXAMINATION: CT OF THE HEAD WITHOUT CONTRAST; CT OF THE CERVICAL SPINE WITHOUT CONTRAST 8/22/2022 5:43 pm TECHNIQUE: CT of the head was performed without the administration of intravenous contrast. Automated exposure control, iterative reconstruction, and/or weight based adjustment of the mA/kV was utilized to reduce the radiation dose to as low as reasonably achievable.; CT of the cervical spine was performed without the administration of intravenous contrast. Multiplanar reformatted images are provided for review.  Automated exposure control, iterative reconstruction, and/or weight based adjustment of the mA/kV was utilized to reduce the radiation dose to as low as reasonably achievable. COMPARISON: None. HISTORY: ORDERING SYSTEM PROVIDED HISTORY: LOC- hit head TECHNOLOGIST PROVIDED HISTORY: Reason for exam:->LOC- hit head Has a \"code stroke\" or \"stroke alert\" been called? ->No Decision Support Exception - unselect if not a suspected or confirmed emergency medical condition->Emergency Medical Condition ([place]) What reading provider will be dictating this exam?->CRC FINDINGS: CT OF THE BRAIN: BRAIN/VENTRICLES: A small focus of predominantly if not entirely subarachnoid hemorrhage is seen in the anterior right frontal lobe (series 2, image 34). There is likely involvement of the adjacent cortex as well. No significant edema or mass effect is seen. The brain is normal in volume. No hydrocephalus seen. ORBITS: The visualized portion of the orbits demonstrate no acute abnormality. SINUSES: Mild-to-moderate mucosal thickening is seen in the right sphenoid sinus. Mild mucosal thickening in the ethmoid sinuses. SOFT TISSUES/SKULL: The calvarium is intact without fracture or focal lesion. The left posterior scalp hematoma is noted. CT CERVICAL SPINE: BONES/ALIGNMENT: There is no acute fracture or traumatic malalignment. DEGENERATIVE CHANGES: Moderate loss of disc height with a disc osteophyte complex is noted at C6-7. At C6-7, there is mild central canal stenosis and moderate neural foraminal stenoses. SOFT TISSUES: There is no prevertebral soft tissue swelling. CT HEAD WITHOUT CONTRAST: 1. SMALL VOLUME SUBARACHNOID BLOOD is seen along the right anterior convexity, with probable involvement of the adjacent cortex. Findings appear to represent a contrecoup injury, given the presence of a hematoma in the left posterior scalp. 2. No mass effect or midline shift. 3. No skull fracture. CT CERVICAL SPINE WITHOUT CONTRAST: 1. No fracture or joint dislocation is seen.  2. Degenerative changes, as described. CT CERVICAL SPINE WO CONTRAST    Result Date: 8/22/2022  EXAMINATION: CT OF THE HEAD WITHOUT CONTRAST; CT OF THE CERVICAL SPINE WITHOUT CONTRAST 8/22/2022 5:43 pm TECHNIQUE: CT of the head was performed without the administration of intravenous contrast. Automated exposure control, iterative reconstruction, and/or weight based adjustment of the mA/kV was utilized to reduce the radiation dose to as low as reasonably achievable.; CT of the cervical spine was performed without the administration of intravenous contrast. Multiplanar reformatted images are provided for review. Automated exposure control, iterative reconstruction, and/or weight based adjustment of the mA/kV was utilized to reduce the radiation dose to as low as reasonably achievable. COMPARISON: None. HISTORY: ORDERING SYSTEM PROVIDED HISTORY: LOC- [employer] head TECHNOLOGIST PROVIDED HISTORY: Reason for exam:->LOC- hit head Has a \"code stroke\" or \"stroke alert\" been called? ->No Decision Support Exception - unselect if not a suspected or confirmed emergency medical condition->Emergency Medical Condition ([place]) What reading provider will be dictating this exam?->CRC FINDINGS: CT OF THE BRAIN: BRAIN/VENTRICLES: A small focus of predominantly if not entirely subarachnoid hemorrhage is seen in the anterior right frontal lobe (series 2, image 34). There is likely involvement of the adjacent cortex as well. No significant edema or mass effect is seen. The brain is normal in volume. No hydrocephalus seen. ORBITS: The visualized portion of the orbits demonstrate no acute abnormality. SINUSES: Mild-to-moderate mucosal thickening is seen in the right sphenoid sinus. Mild mucosal thickening in the ethmoid sinuses. SOFT TISSUES/SKULL: The calvarium is intact without fracture or focal lesion. The left posterior scalp hematoma is noted. CT CERVICAL SPINE: BONES/ALIGNMENT: There is no acute fracture or traumatic malalignment. DEGENERATIVE CHANGES: Moderate loss of disc height with a disc osteophyte complex is noted at C6-7. At C6-7, there is mild central canal stenosis and moderate neural foraminal stenoses. SOFT TISSUES: There is no prevertebral soft tissue swelling. CT HEAD WITHOUT CONTRAST: 1. SMALL VOLUME SUBARACHNOID BLOOD is seen along the right anterior convexity, with probable involvement of the adjacent cortex. Findings appear to represent a contrecoup injury, given the presence of a hematoma in the left posterior scalp. 2. No mass effect or midline shift. 3. No skull fracture. CT CERVICAL SPINE WITHOUT CONTRAST: 1. No fracture or joint dislocation is seen. 2. Degenerative changes, as described. CTA NECK W CONTRAST    Result Date: 8/22/2022  EXAMINATION: CTA OF THE HEAD WITH CONTRAST; CTA OF THE NECK 8/22/2022 10:49 pm: TECHNIQUE: CTA of the head/brain was performed with the administration of intravenous contrast. Multiplanar reformatted images are provided for review. MIP images are provided for review. Automated exposure control, iterative reconstruction, and/or weight based adjustment of the mA/kV was utilized to reduce the radiation dose to as low as reasonably achievable.; CTA of the neck was performed with the administration of intravenous contrast. Multiplanar reformatted images are provided for review. MIP images are provided for review. Stenosis of the internal carotid arteries measured using NASCET criteria. Automated exposure control, iterative reconstruction, and/or weight based adjustment of the mA/kV was utilized to reduce the radiation dose to as low as reasonably achievable. Noncontrast CT of the head with reconstructed 2-D images are also provided for review. COMPARISON: Head CT from earlier the same day.  HISTORY: ORDERING SYSTEM PROVIDED HISTORY: fall, subarachnoid hemorrhage on non-con head TECHNOLOGIST PROVIDED HISTORY: Reason for exam:->fall, subarachnoid hemorrhage on non-con head Has a \"code stroke\" or \"stroke alert\" been called? ->No Decision Support Exception - unselect if not a suspected or confirmed emergency medical condition->Emergency Medical Condition ([place]) What reading provider will be dictating this exam?->CRC; ORDERING SYSTEM PROVIDED HISTORY: fall, subarachnoid hemorrhage TECHNOLOGIST PROVIDED HISTORY: Reason for exam:->fall, subarachnoid hemorrhage Has a \"code stroke\" or \"stroke alert\" been called? ->No Decision Support Exception - unselect if not a suspected or confirmed emergency medical condition->Emergency Medical Condition ([place]) What reading provider will be dictating this exam?->CRC FINDINGS: CT HEAD: BRAIN/VENTRICLES:  Stable mild subarachnoid hemorrhage along the right frontal sulcations. Differentiation is maintained. No evidence of mass, mass effect or midline shift. No evidence of hydrocephalus. ORBITS: The visualized portion of the orbits demonstrate no acute abnormality. SINUSES:  Mild-to-moderate circumferential mucosal thickening involving the right maxillary sinus. The remainder of the visualized paranasal sinuses and mastoid air cells demonstrate no acute abnormality. SOFT TISSUES/SKULL: No acute abnormality of the visualized skull or soft tissues. CTA NECK: AORTIC ARCH/ARCH VESSELS: No dissection or arterial injury. No significant stenosis of the brachiocephalic or subclavian arteries. CAROTID ARTERIES: No dissection, arterial injury, or hemodynamically significant stenosis by NASCET criteria. VERTEBRAL ARTERIES: No dissection, arterial injury, or significant stenosis. SOFT TISSUES: The lung apices are clear. No cervical or superior mediastinal lymphadenopathy. The larynx and pharynx are unremarkable. No acute abnormality of the salivary and thyroid glands. BONES: No acute osseous abnormality. CTA HEAD: ANTERIOR CIRCULATION: No significant stenosis of the intracranial internal carotid, anterior cerebral, or middle cerebral arteries. No aneurysm.  POSTERIOR CIRCULATION: No significant stenosis of the vertebral, basilar, or posterior cerebral arteries. No aneurysm. OTHER: No dural venous sinus thrombosis on this non-dedicated study. 1. Stable mild subarachnoid hemorrhage along the right frontal sulcations. 2. No evidence of large vessel occlusion or hemodynamically significant stenosis involving the head and neck arteries. No evidence of aneurysm or vascular malformation. CTA PULMONARY W CONTRAST    Result Date: 8/22/2022  EXAMINATION: CTA OF THE CHEST 8/22/2022 10:49 pm TECHNIQUE: CTA of the chest was performed after the administration of intravenous contrast.  Multiplanar reformatted images are provided for review. MIP images are provided for review. Automated exposure control, iterative reconstruction, and/or weight based adjustment of the mA/kV was utilized to reduce the radiation dose to as low as reasonably achievable. COMPARISON: None. HISTORY: ORDERING SYSTEM PROVIDED HISTORY: pe TECHNOLOGIST PROVIDED HISTORY: Reason for exam:->pe Decision Support Exception - unselect if not a suspected or confirmed emergency medical condition->Emergency Medical Condition ([place]) What reading provider will be dictating this exam?->CRC FINDINGS: Pulmonary Arteries: Pulmonary arteries are adequately opacified for evaluation. No evidence of intraluminal filling defect to suggest pulmonary embolism. Main pulmonary artery is normal in caliber. Mediastinum: No evidence of mediastinal lymphadenopathy. The heart and pericardium demonstrate no acute abnormality. There is no acute abnormality of the thoracic aorta. Lungs/pleura: The lungs are without acute process. No focal consolidation or pulmonary edema. No evidence of pleural effusion or pneumothorax. Upper Abdomen: Limited images of the upper abdomen are unremarkable. Soft Tissues/Bones: No acute bone or soft tissue abnormality. No evidence of pulmonary embolism or acute pulmonary abnormality.  RECOMMENDATIONS: Unavailable     CTA HEAD W CONTRAST    Result Date: 8/22/2022  EXAMINATION: CTA OF THE HEAD WITH CONTRAST; CTA OF THE NECK 8/22/2022 10:49 pm: TECHNIQUE: CTA of the head/brain was performed with the administration of intravenous contrast. Multiplanar reformatted images are provided for review. MIP images are provided for review. Automated exposure control, iterative reconstruction, and/or weight based adjustment of the mA/kV was utilized to reduce the radiation dose to as low as reasonably achievable.; CTA of the neck was performed with the administration of intravenous contrast. Multiplanar reformatted images are provided for review. MIP images are provided for review. Stenosis of the internal carotid arteries measured using NASCET criteria. Automated exposure control, iterative reconstruction, and/or weight based adjustment of the mA/kV was utilized to reduce the radiation dose to as low as reasonably achievable. Noncontrast CT of the head with reconstructed 2-D images are also provided for review. COMPARISON: Head CT from earlier the same day. HISTORY: ORDERING SYSTEM PROVIDED HISTORY: fall, subarachnoid hemorrhage on non-con head TECHNOLOGIST PROVIDED HISTORY: Reason for exam:->fall, subarachnoid hemorrhage on non-con head Has a \"code stroke\" or \"stroke alert\" been called? ->No Decision Support Exception - unselect if not a suspected or confirmed emergency medical condition->Emergency Medical Condition ([place]) What reading provider will be dictating this exam?->CRC; ORDERING SYSTEM PROVIDED HISTORY: fall, subarachnoid hemorrhage TECHNOLOGIST PROVIDED HISTORY: Reason for exam:->fall, subarachnoid hemorrhage Has a \"code stroke\" or \"stroke alert\" been called? ->No Decision Support Exception - unselect if not a suspected or confirmed emergency medical condition->Emergency Medical Condition ([place]) What reading provider will be dictating this exam?->CRC FINDINGS: CT HEAD: BRAIN/VENTRICLES:  Stable mild subarachnoid hemorrhage along the right frontal sulcations. Differentiation is maintained. No evidence of mass, mass effect or midline shift. No evidence of hydrocephalus. ORBITS: The visualized portion of the orbits demonstrate no acute abnormality. SINUSES:  Mild-to-moderate circumferential mucosal thickening involving the right maxillary sinus. The remainder of the visualized paranasal sinuses and mastoid air cells demonstrate no acute abnormality. SOFT TISSUES/SKULL: No acute abnormality of the visualized skull or soft tissues. CTA NECK: AORTIC ARCH/ARCH VESSELS: No dissection or arterial injury. No significant stenosis of the brachiocephalic or subclavian arteries. CAROTID ARTERIES: No dissection, arterial injury, or hemodynamically significant stenosis by NASCET criteria. VERTEBRAL ARTERIES: No dissection, arterial injury, or significant stenosis. SOFT TISSUES: The lung apices are clear. No cervical or superior mediastinal lymphadenopathy. The larynx and pharynx are unremarkable. No acute abnormality of the salivary and thyroid glands. BONES: No acute osseous abnormality. CTA HEAD: ANTERIOR CIRCULATION: No significant stenosis of the intracranial internal carotid, anterior cerebral, or middle cerebral arteries. No aneurysm. POSTERIOR CIRCULATION: No significant stenosis of the vertebral, basilar, or posterior cerebral arteries. No aneurysm. OTHER: No dural venous sinus thrombosis on this non-dedicated study. 1. Stable mild subarachnoid hemorrhage along the right frontal sulcations. 2. No evidence of large vessel occlusion or hemodynamically significant stenosis involving the head and neck arteries. No evidence of aneurysm or vascular malformation.      CBC:   Lab Results   Component Value Date/Time    WBC 9.1 08/24/2022 04:43 AM    RBC 5.10 08/24/2022 04:43 AM    HGB 14.5 08/24/2022 04:43 AM    HCT 42.6 08/24/2022 04:43 AM    MCV 83.5 08/24/2022 04:43 AM    MCH 28.4 08/24/2022 04:43 AM    MCHC 34.0 08/24/2022 04:43 AM    RDW 12.9 08/24/2022 04:43 AM     08/24/2022 04:43 AM    MPV 10.6 08/24/2022 04:43 AM     BMP:    Lab Results   Component Value Date/Time     08/24/2022 04:43 AM    K 4.2 08/24/2022 04:43 AM     08/24/2022 04:43 AM    CO2 25 08/24/2022 04:43 AM    BUN 15 08/24/2022 04:43 AM    LABALBU 4.7 08/22/2022 03:45 PM    CREATININE 1.2 08/24/2022 04:43 AM    CALCIUM 9.2 08/24/2022 04:43 AM    GFRAA >60 08/24/2022 04:43 AM    LABGLOM >60 08/24/2022 04:43 AM    GLUCOSE 98 08/24/2022 04:43 AM      levETIRAcetam  500 mg Oral BID    sodium chloride flush  10 mL IntraVENous 2 times per day    polyethylene glycol  17 g Oral Daily    bisacodyl  5 mg Oral Daily     Remains awake and alert oriented friendly cooperative speech is fluent pupils equal round reactive to light  Assessment:  Patient Active Problem List   Diagnosis    Hypertension    Obesity    LBBB (left bundle branch block)    SOB (shortness of breath)    JOANNA on CPAP    Trauma    Syncope    SAH (subarachnoid hemorrhage) ([facility])     Plan: Await full syncopal work-up cardiology evaluation continue current care  Paradise Rojo MD M.D.

## 2022-09-14 ENCOUNTER — HOSPITAL ENCOUNTER (OUTPATIENT)
Dept: CT IMAGING | Age: 57
Discharge: HOME | End: 2022-09-16
Payer: COMMERCIAL

## 2022-09-14 DIAGNOSIS — I60.9 SAH (SUBARACHNOID HEMORRHAGE) (HCC): ICD-10-CM

## 2022-09-14 PROCEDURE — 70450 CT HEAD/BRAIN W/O DYE: CPT

## 2022-09-22 ENCOUNTER — TELEPHONE (OUTPATIENT)
Dept: ADMINISTRATIVE | Age: 57
End: 2022-09-22

## 2022-09-22 NOTE — TELEPHONE ENCOUNTER
Mray Gregory with Dr. Michaell Claude office calling to schedule NP    Patient Appointment Form:      PCP: Dr. Katelin Guzman  Referring: Dr. Rajan Vela    Has the Patient:    Se[facility] a [facility]ologist? Yes [facility] [address] [facility]     Had a heart catheterization? no    Had heart surgery? no    Had a stress test or nuclear stress test? Yes 2011 Epic     Had an echocardiogram? Yes 8/23/22 Epic and 2011 Epic     Had a vascular ultrasound? no    Had a 24/48 heart monitor or extended cardiac event monitor? Yes 2013 Epic     Had recent blood work in the last 6 months? Yes some PCP Epic     Had a pacemaker/ICD/ILR implant? no    Seen an Electrophysiologist? no        Will send records via:  All prior recs in [facility] in [address] [place] [address] Chris - martag referral to Edi      Date & time of appointment:  9/28/22 @ 1:00 with Dr. Camron Morris

## 2022-09-28 ENCOUNTER — OFFICE VISIT (OUTPATIENT)
Dept: CARDIOLOGY CLINIC | Age: 57
End: 2022-09-28
Payer: COMMERCIAL

## 2022-09-28 VITALS
OXYGEN SATURATION: 98 % | DIASTOLIC BLOOD PRESSURE: 76 MMHG | WEIGHT: 230 LBS | RESPIRATION RATE: 16 BRPM | BODY MASS INDEX: 32.2 KG/M2 | HEIGHT: 71 IN | SYSTOLIC BLOOD PRESSURE: 138 MMHG | HEART RATE: 67 BPM

## 2022-09-28 DIAGNOSIS — I10 PRIMARY HYPERTENSION: ICD-10-CM

## 2022-09-28 DIAGNOSIS — E66.9 OBESITY (BMI 30.0-34.9): ICD-10-CM

## 2022-09-28 DIAGNOSIS — I44.7 LBBB (LEFT BUNDLE BRANCH BLOCK): ICD-10-CM

## 2022-09-28 DIAGNOSIS — R55 SYNCOPE, UNSPECIFIED SYNCOPE TYPE: Primary | ICD-10-CM

## 2022-09-28 PROCEDURE — 99204 OFFICE O/P NEW MOD 45 MIN: CPT | Performed by: INTERNAL MEDICINE

## 2022-09-28 PROCEDURE — G8417 CALC BMI ABV UP PARAM F/U: HCPCS | Performed by: INTERNAL MEDICINE

## 2022-09-28 PROCEDURE — 4004F PT TOBACCO SCREEN RCVD TLK: CPT | Performed by: INTERNAL MEDICINE

## 2022-09-28 PROCEDURE — G8427 DOCREV CUR MEDS BY ELIG CLIN: HCPCS | Performed by: INTERNAL MEDICINE

## 2022-09-28 PROCEDURE — 3017F COLORECTAL CA SCREEN DOC REV: CPT | Performed by: INTERNAL MEDICINE

## 2022-09-28 PROCEDURE — 93000 ELECTROCARDIOGRAM COMPLETE: CPT | Performed by: INTERNAL MEDICINE

## 2022-09-28 RX ORDER — FUROSEMIDE 20 MG/1
20 TABLET ORAL DAILY
Qty: 30 TABLET | Refills: 1 | Status: SHIPPED | OUTPATIENT
Start: 2022-09-28

## 2022-09-28 RX ORDER — IBUPROFEN 200 MG
400 TABLET ORAL PRN
COMMUNITY

## 2022-09-28 NOTE — PROGRESS NOTES
14 day Zio XT Monitor was placed per Dr Virgilio Romero. Serial number [id number]. Instructions were given & patient verbalized understanding.

## 2022-09-28 NOTE — PATIENT INSTRUCTIONS
Continue all your medications at current doses. Start taking lasix 20 mg daily for the next 3 days. Call me with an update. Hold the metoprolol while you take the lasix. We will schedule a 2 week monitor. Restrict sodium intake to less than 2-2.5 g/day. Restrict fluid intake to less than 2.2 L/day. Goal BP is less than 130/80. If your weight increases by > 2 lbs in a day or >5 lbs in more than a day, take an extra lasix pill. Please try to exercise for 150 minutes a week. I will see you back in the office in 6 months. Please call the office at ([phone], option 2) if you have any questions.

## 2022-09-28 NOTE — PROGRESS NOTES
CHIEF COMPLAINT:   Chief Complaint   Patient presents with    Loss of Consciousness     Consult, per  [id number], d/t syncope. No other cardiac complaints. Labs 8/22. Hx + Covid 8/14/22. Seen by [facility] 12/2011 & had echo & stress done. HISTORY OF PRESENT ILLNESS: Patient is a 60 y.o. male seen at the request of Madeleine Bryant DO to establish care with me. He has a history of moderate obesity, hypertension, possible history of orthostatic hypotension, chronic left bundle branch block. He has been referred to me for further evaluation of the above-mentioned complaints. At the beginning of August of this year, he had COVID. He did not need hospitalization for the same. He works as a registered nurse. Following recovery, he was back to work. On the first day, he had a syncopal episode. He did not have any prodromal symptoms. He lost consciousness for a few seconds. Subsequently, he underwent an echocardiogram which is summarized below. He has noticed episodes of what he describes as positional dizziness. It is a woozy sensation without any spinning. He has not had any subsequent episodes of syncope, however. Following this episode, he was admitted to the hospital for 3 days. Work-up was essentially negative. At today's office visit, Hedenies any chest pain,  palpitations, dizziness, pedal edema. The patient is capable of activities of daily living. There is dyspnea on more than moderate exertion. There is no orthopnea or PND. He is compliant with medications as well as diet and exercise regimen. Prior Cardiac workup:  Echo from 8/24/2022: EF is 50%. Mild concentric LVH. Stage I diastolic dysfunction. Normal RV size and function.       Past Medical History:   Diagnosis Date    COVID-19 08/14/2022    Hypertension     LBBB (left bundle branch block)     Obesity     JOANNA on CPAP 01/01/2001    SOB (shortness of breath) 01/01/2001       Allergies   Allergen Reactions    Paxil [Paroxetine]     Pcn [Penicillins]        Current Outpatient Medications   Medication Sig Dispense Refill    ibuprofen (ADVIL;MOTRIN) 200 MG tablet Take 400 mg by mouth as needed for Pain      metoprolol succinate (TOPROL XL) 25 MG extended release tablet Take 25 mg by mouth daily       No current facility-administered medications for this visit. Social History     Socioeconomic History    Marital status:      Spouse name: Not on file    Number of children: Not on file    Years of education: Not on file    Highest education level: Not on file   Occupational History    Not on file   Tobacco Use    Smoking status: Never    Smokeless tobacco: Current     Types: Snuff   Vaping Use    Vaping Use: Never used   Substance and Sexual Activity    Alcohol use: Not Currently    Drug use: Never    Sexual activity: Not on file   Other Topics Concern    Not on file   Social History Narrative    Drinks 1-2 cups of coffee/Pop daily     Social Determinants of Health     Financial Resource Strain: Not on file   Food Insecurity: Not on file   Transportation Needs: Not on file   Physical Activity: Not on file   Stress: Not on file   Social Connections: Not on file   Intimate Partner Violence: Not on file   Housing Stability: Not on file       Family History   Adopted: Yes       Review of Systems  Constitutional: Negative for fever, malaise/fatigue and weight loss. HENT: Negative for sore throat and tinnitus. Eyes: Negative for blurred vision and double vision. Respiratory: Negative for shortness of breath. Negative for cough and wheezing. Cardiovascular: As mentioned in HPI. Gastrointestinal: Negative for abdominal pain, heartburn, nausea and vomiting. Genitourinary: Negative. Musculoskeletal: Negative for back pain, joint pain and myalgias. Neurological: Negative for dizziness, tremors, loss of consciousness and headaches. Endo/Heme/Allergies: Negative.     Psychiatric/Behavioral: Negative for depression and suicidal ideas. Physical Exam   /76 (Site: Right Upper Arm, Position: Sitting, Cuff Size: Medium Adult)   Pulse 67   Resp 16   Ht 5' 11\" (1.803 m)   Wt 230 lb (104.3 kg)   SpO2 98%   BMI 32.08 kg/m²   Constitutional: Oriented to person, place, and time. Well-developed and well-nourished. No distress. Head: Normocephalic and atraumatic. Eyes: EOM are normal. Pupils are equal, round, and reactive to light. Neck: Normal range of motion. Neck supple. Positive hepatojugular reflux. Carotid bruit is not present. No tracheal deviation present. No thyromegaly present. Cardiovascular: Normal rate, regular rhythm, normal heart sounds and intact distal pulses. Exam reveals no gallop and no friction rub. No murmur heard. Pulmonary/Chest: Effort normal and breath sounds normal. No respiratory distress. No wheezes. No rales. No tenderness. Abdominal: Soft. Bowel sounds are normal. No distension and no mass. No tenderness. No rebound and no guarding. Musculoskeletal: Normal range of motion. No edema and no tenderness. Lymphadenopathy:   No cervical adenopathy. Neurological: Alert and oriented to person, place, and time. Skin: Skin is warm and dry. No rash noted. Not diaphoretic. No erythema. Psychiatric: Normal mood and affect. Behavior is normal.     Procedures and Testing  EKG: Read independently by me in the office today. Normal sinus rhythm. Left bundle branch block with a QRS interval of 144 ms. ASSESSMENT:   Diagnosis Orders   1. Syncope, unspecified syncope type  EKG 12 Lead           Plan:  1.:  Syncopal episode: Etiology is unclear at this point. He did not have any prodromal symptoms which does raise some concern for cardiac etiology. We will schedule him for a 2-week Holter monitor to look for any underlying tacky or bradyarrhythmias. Recent echocardiogram reviewed as above.   2.  Dyspnea on exertion, heart failure preserved ejection fraction: He does appear mildly hypervolemic on examination today. I will do a trial of Lasix 20 mg daily. He will call me next week with a symptom update. Heart failure education provided. He does have concerns for orthostatic hypotension and thus I will hold his Toprol-XL for a few days. He was initiated on Toprol-XL in the past for tachycardia. 3.  Moderate obesity: He admits to dietary indiscretion recently on account of COVID. Counseled about compliance with diet, exercise and weight loss. He is determined to lose weight. 4.  Left bundle branch block: This is not a new finding. Echocardiogram results as above. I will check a Holter monitor as above. We will continue monitoring him for any signs of worsening conductive system disease. Dany Whittington MD  [facility]) Cardiology     NOTE: This report was transcribed using voice recognition software. Every effort was made to ensure accuracy; however, inadvertent computerized transcription errors may be present.

## 2022-10-27 DIAGNOSIS — R55 SYNCOPE, UNSPECIFIED SYNCOPE TYPE: ICD-10-CM
